# Patient Record
Sex: MALE | Race: WHITE | Employment: FULL TIME | URBAN - METROPOLITAN AREA
[De-identification: names, ages, dates, MRNs, and addresses within clinical notes are randomized per-mention and may not be internally consistent; named-entity substitution may affect disease eponyms.]

---

## 2019-01-30 ENCOUNTER — OFFICE VISIT (OUTPATIENT)
Dept: FAMILY MEDICINE CLINIC | Age: 39
End: 2019-01-30

## 2019-01-30 VITALS
HEIGHT: 70 IN | DIASTOLIC BLOOD PRESSURE: 82 MMHG | HEART RATE: 74 BPM | RESPIRATION RATE: 18 BRPM | SYSTOLIC BLOOD PRESSURE: 136 MMHG | BODY MASS INDEX: 29.81 KG/M2 | TEMPERATURE: 97.4 F | WEIGHT: 208.2 LBS | OXYGEN SATURATION: 98 %

## 2019-01-30 DIAGNOSIS — I10 BENIGN ESSENTIAL HTN: Primary | ICD-10-CM

## 2019-01-30 DIAGNOSIS — E66.3 OVERWEIGHT (BMI 25.0-29.9): ICD-10-CM

## 2019-01-30 DIAGNOSIS — A04.8 HELICOBACTER PYLORI INFECTION: ICD-10-CM

## 2019-01-30 DIAGNOSIS — Z76.89 ENCOUNTER TO ESTABLISH CARE: ICD-10-CM

## 2019-01-30 DIAGNOSIS — J30.9 ALLERGIC RHINITIS, UNSPECIFIED SEASONALITY, UNSPECIFIED TRIGGER: ICD-10-CM

## 2019-01-30 RX ORDER — OMEPRAZOLE 40 MG/1
CAPSULE, DELAYED RELEASE ORAL
Refills: 1 | COMMUNITY
Start: 2019-01-02 | End: 2019-01-30 | Stop reason: SDUPTHER

## 2019-01-30 RX ORDER — OMEPRAZOLE 40 MG/1
CAPSULE, DELAYED RELEASE ORAL
Qty: 90 CAP | Refills: 3 | Status: SHIPPED | OUTPATIENT
Start: 2019-01-30 | End: 2019-06-26

## 2019-01-30 RX ORDER — RANITIDINE 150 MG/1
150 CAPSULE ORAL DAILY
COMMUNITY
End: 2019-06-26

## 2019-01-30 NOTE — PROGRESS NOTES
Layne Johnson is a 34 y.o. male Chief Complaint Patient presents with  New Patient  Esophageal Reflux [facility] Establish Care Patient states he has pain that comes and goes in his sternum area. He depends on what he eats to bring on the pain Patient states he coughs every morning but no other time 1. Have you been to the ER, urgent care clinic since your last visit? Hospitalized since your last visit? [facility] upper chest and stomach pain . Labs and EKG performed. Results H Polri  1/2019 
M 
2. Have you seen or consulted any other health care providers outside of the [address] [place] [address] since your last visit? Include any pap smears or colon screening. No 
 
 
Visit Vitals BP (!) 141/97 (BP 1 Location: Left arm, BP Patient Position: Sitting) Pulse 74 Temp 97.4 °F (36.3 °C) (Oral) Resp 18 Ht 5' 10\" (1.778 m) Wt 208 lb 3.2 oz (94.4 kg) SpO2 98% BMI 29.87 kg/m² Health Maintenance Due Topic Date Due  
 DTaP/Tdap/Td series (1 - Tdap) 04/25/1999  Influenza Age 5 to Adult  08/01/2018 Medication Reconciliation completed, changes noted.   Please  Update medication list.

## 2019-01-30 NOTE — PROGRESS NOTES
Family Medicine Initial Office Visit Patient: Scottie Hendrix 1980, 34 y.o., male Encounter Date: 1/30/2019 ASSESSMENT & PLAN 
  ICD-10-CM ICD-9-CM 1. Benign essential HTN I10 401.1 2. Encounter to establish care Z76.89 V65.8 3. Allergic rhinitis, unspecified seasonality, unspecified trigger J30.9 477.9 4. Helicobacter pylori infection A04.8 041.86   
5. Overweight (BMI 25.0-29. 9) E66.3 278.02 Orders Placed This Encounter  DISCONTD: omeprazole (PRILOSEC) 40 mg capsule Sig: TAKE 1 CAPSULE BY MOUTH EVERY DAY Refill:  1  raNITIdine hcl 150 mg capsule Sig: Take 150 mg by mouth daily.  omeprazole (PRILOSEC) 40 mg capsule Sig: TAKE 1 CAPSULE BY MOUTH EVERY DAY Dispense:  90 Cap Refill:  3 Benign essential HTN 
BP slightly elevated initially but normalized. Patient has been stable on medications for several years, will continue with all medications as previously prescribed, I will prescribe him refills on appropriate. Will request labs from prior doctor's offices Patient Instructions Please take H. pylori antibiotics as prescribed Omeprazole refilled Encourage the patient to continue following a healthy diet and exercise regularly, goal of moderate weight loss is appropriate given BMI of 29.8 I encouraged the patient to continue to use his Flonase and we discussed the use of over-the-counter antihistamine medications We will request records from his previous physicians offices I will see him back in 6 months CHIEF COMPLAINT Chief Complaint Patient presents with  New Patient  Esophageal Reflux [facility] Establish Care SUBJECTIVE Scottie Hendrix is a 34 y.o. male presenting today for establishing care. [facility]. He was recently seen at [address] [place] [address] and was seen by a doctor who told him he had H pylori, was given meds, he didn't take the medications. Did take the omeprazole. 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 He enjoys his work. Patient lives in a house with wife and 2 kids (15 and 3) Patient was last seen by primary care recently ([facility]) Patient last saw a dentist within the last year Patient last had eye exam about a year ago Exercise: not often-goes through phases, goes to [facility], leads an active lifestyle Diet / Weight: tries to eat a healthy diet, eats out on the road a lot, tries to make good choices. Weight is stable Tobacco: no, quit 08/2015, smoked for 20 years, 1/2 ppd EtOH: yes, 2-3 times a week has 6-8 drinks each time. Drinks beer, is able to cut back needing to take medication. Illicit Substances: none Sexual Activity:yes one female partner, had a vasectomy 2014. Declines sti testing. Flu shot: had in Fall HTN: [facility], has been on these meds for 7-8 years. Family history of high blood pressure, mom had some mini strokes as well. When he stopped smoking he wanted to quit the meds, but he was told they were healthy to take. Patient reports that bottom number is normally around 80 and the top number generally runs 130-140. Denies headaches, vision changes, chest pain or shortness of breath. H Pylori: has had a lot of epigastric pain, radiating up towards neck and through chest towards back and shoulder blades. Anxiety: has a history--originally started on atenolol for this, now takes for bp as well. Review of Systems Constitutional: Negative for chills and fever. Eyes: Negative for visual disturbance. Respiratory: Negative for shortness of breath. Cardiovascular: Negative for chest pain and leg swelling. Gastrointestinal: Negative for constipation, diarrhea, nausea and vomiting. Genitourinary: Negative for difficulty urinating. Musculoskeletal: Negative for arthralgias and myalgias. Neurological: Negative for seizures, syncope and headaches. Psychiatric/Behavioral: At Baseline, stable All other systems reviewed and are negative. Today denies ROS as noted, historical data listed above. OBJECTIVE Visit Vitals /82 Pulse 74 Temp 97.4 °F (36.3 °C) (Oral) Resp 18 Ht 5' 10\" (1.778 m) Wt 208 lb 3.2 oz (94.4 kg) SpO2 98% BMI 29.87 kg/m² Physical Exam  
Constitutional: He is oriented to person, place, and time. He appears well-developed and well-nourished. No distress. NAD, Nontoxic, Appears Stated Age HENT:  
Head: Normocephalic and atraumatic. Mouth/Throat: Oropharynx is clear and moist.  
Eyes: Conjunctivae and EOM are normal. Right eye exhibits no discharge. Left eye exhibits no discharge. No scleral icterus. Neck: Neck supple. Cardiovascular: Normal rate, regular rhythm and normal heart sounds. No murmur heard. Pulmonary/Chest: Effort normal and breath sounds normal. No stridor. No respiratory distress. He has no wheezes. He has no rales. Abdominal: Soft. Bowel sounds are normal. He exhibits no distension. There is tenderness (mild epigastric). Musculoskeletal: He exhibits no edema or tenderness. Neurological: He is alert and oriented to person, place, and time. No cranial nerve deficit. Grossly intact CN Skin: Skin is warm and dry. No rash noted. He is not diaphoretic. Psychiatric: He has a normal mood and affect. His behavior is normal.  
Nursing note and vitals reviewed. No results found for any visits on 01/30/19. HISTORICAL Reviewed and updated today, and as noted below: 
 
Past Medical History:  
Diagnosis Date  Hypertension Past Surgical History:  
Procedure Laterality Date  HX VASECTOMY  2014 Family History Problem Relation Age of Onset  Hypertension Mother  Stroke Mother  Hypertension Father  Diabetes Maternal Grandmother Social History Tobacco Use Smoking Status Former Smoker  Last attempt to quit: 8/1/2015  Years since quitting: 3.5 Smokeless Tobacco Former User Social History Socioeconomic History  Marital status: SINGLE Spouse name: Not on file  Number of children: Not on file  Years of education: Not on file  Highest education level: Not on file Tobacco Use  Smoking status: Former Smoker Last attempt to quit: 8/1/2015 Years since quitting: 3.5  Smokeless tobacco: Former User Substance and Sexual Activity  Alcohol use: Yes Alcohol/week: 6.0 oz Types: 10 Cans of beer per week  Sexual activity: Yes  
  Partners: Female No Known Allergies No visits with results within 3 Month(s) from this visit. Latest known visit with results is: No results found for any previous visit. Josee Rangel MD 
[address] 01/30/19 8:52 AM 
 
Portions of this note may have been populated using smart dictation software and may have \"sounds-like\" errors present.

## 2019-01-30 NOTE — ASSESSMENT & PLAN NOTE
BP slightly elevated initially but normalized. Patient has been stable on medications for several years, will continue with all medications as previously prescribed, I will prescribe him refills on appropriate. Will request labs from prior doctor's offices

## 2019-01-30 NOTE — PATIENT INSTRUCTIONS
Please take H. pylori antibiotics as prescribed Omeprazole refilled Encourage the patient to continue following a healthy diet and exercise regularly, goal of moderate weight loss is appropriate given BMI of 29.8 I encouraged the patient to continue to use his Flonase and we discussed the use of over-the-counter antihistamine medications We will request records from his previous physicians offices I will see him back in 6 months

## 2019-06-26 ENCOUNTER — OFFICE VISIT (OUTPATIENT)
Dept: FAMILY MEDICINE CLINIC | Age: 39
End: 2019-06-26

## 2019-06-26 VITALS
HEART RATE: 57 BPM | SYSTOLIC BLOOD PRESSURE: 136 MMHG | WEIGHT: 200 LBS | BODY MASS INDEX: 28.63 KG/M2 | TEMPERATURE: 97.7 F | HEIGHT: 70 IN | RESPIRATION RATE: 18 BRPM | DIASTOLIC BLOOD PRESSURE: 86 MMHG

## 2019-06-26 DIAGNOSIS — I10 BENIGN ESSENTIAL HTN: ICD-10-CM

## 2019-06-26 DIAGNOSIS — T75.3XXA SEA SICKNESS, INITIAL ENCOUNTER: Primary | ICD-10-CM

## 2019-06-26 DIAGNOSIS — J30.9 ALLERGIC RHINITIS: ICD-10-CM

## 2019-06-26 DIAGNOSIS — E66.3 OVERWEIGHT (BMI 25.0-29.9): ICD-10-CM

## 2019-06-26 RX ORDER — METOPROLOL SUCCINATE 25 MG/1
25 TABLET, EXTENDED RELEASE ORAL DAILY
Qty: 30 TAB | Refills: 11 | Status: SHIPPED | OUTPATIENT
Start: 2019-06-26 | End: 2020-06-16 | Stop reason: SDUPTHER

## 2019-06-26 RX ORDER — FLUTICASONE PROPIONATE 50 MCG
2 SPRAY, SUSPENSION (ML) NASAL DAILY
Qty: 1 BOTTLE | Refills: 3 | Status: SHIPPED | OUTPATIENT
Start: 2019-06-26 | End: 2019-11-09 | Stop reason: SDUPTHER

## 2019-06-26 RX ORDER — SCOLOPAMINE TRANSDERMAL SYSTEM 1 MG/1
1 PATCH, EXTENDED RELEASE TRANSDERMAL
Qty: 3 PATCH | Refills: 0 | Status: SHIPPED | OUTPATIENT
Start: 2019-06-26 | End: 2019-12-18

## 2019-06-26 RX ORDER — LISINOPRIL 10 MG/1
10 TABLET ORAL DAILY
Qty: 30 TAB | Refills: 11 | Status: SHIPPED | OUTPATIENT
Start: 2019-06-26 | End: 2020-06-16 | Stop reason: SDUPTHER

## 2019-06-26 RX ORDER — LORATADINE 10 MG/1
10 TABLET ORAL DAILY
Qty: 30 TAB | Refills: 11 | Status: SHIPPED | OUTPATIENT
Start: 2019-06-26 | End: 2020-06-16 | Stop reason: SDUPTHER

## 2019-06-26 RX ORDER — METOPROLOL SUCCINATE 25 MG/1
TABLET, EXTENDED RELEASE ORAL DAILY
COMMUNITY
End: 2019-06-26 | Stop reason: SDUPTHER

## 2019-06-26 NOTE — PROGRESS NOTES
Family Medicine Follow-Up Progress Note  Patient: Flynn Rasmussen  1980, 37 y.o., male  Encounter Date: 2019    ASSESSMENT & PLAN    ICD-10-CM ICD-9-CM    1. Sea sickness, initial encounter T75. 3XXA 994.6 scopolamine (TRANSDERM-SCOP) 1 mg over 3 days pt3d   2. Allergic rhinitis J30.9 477.9 fluticasone propionate (FLONASE) 50 mcg/actuation nasal spray   3. Overweight (BMI 25.0-29. 9) V72.0 625.56 METABOLIC PANEL, COMPREHENSIVE      LIPID PANEL   4. Benign essential HTN I10 401.1 CBC W/O DIFF      METABOLIC PANEL, COMPREHENSIVE      LIPID PANEL       Orders Placed This Encounter    CBC W/O DIFF    METABOLIC PANEL, COMPREHENSIVE    LIPID PANEL    DISCONTD: metoprolol succinate (TOPROL-XL) 25 mg XL tablet     Sig: Take  by mouth daily.  scopolamine (TRANSDERM-SCOP) 1 mg over 3 days pt3d     Si Patch by TransDERmal route every seventy-two (72) hours. Dispense:  3 Patch     Refill:  0    lisinopril (PRINIVIL, ZESTRIL) 10 mg tablet     Sig: Take 1 Tab by mouth daily. Dispense:  30 Tab     Refill:  11    metoprolol succinate (TOPROL-XL) 25 mg XL tablet     Sig: Take 1 Tab by mouth daily. Dispense:  30 Tab     Refill:  11    fluticasone propionate (FLONASE) 50 mcg/actuation nasal spray     Si Sprays by Both Nostrils route daily. Dispense:  1 Bottle     Refill:  3    loratadine (CLARITIN) 10 mg tablet     Sig: Take 1 Tab by mouth daily. Dispense:  30 Tab     Refill:  11       Patient Instructions   Blood pressure well controlled, continue with current medications, go for labs as ordered  Encourage exercise, maintenance of healthy weight, healthy low-sodium high potassium diet  Patient is about to go on a cruise, he is worried about seasickness, he has become seasick in the past, we will go ahead and provide him with a scopolamine patch, use 1 patch every 3 days while at see if seasickness develops.   Also discussed over-the-counter remedies and also using seasickness bands which are available at many stores  Follow-up in 6 months or as needed, labs today, fasting      CHIEF COMPLAINT  Chief Complaint   Patient presents with    Hypertension     follow up    [facility] 10/2018-we have records        Drew Corral 70. is a 37 y.o. male presenting today for follow-up. The patient takes his lisinopril and metoprolol daily. He reports he is tolerating the mall without any side effects. He was initially started on them when he smoked, he quit smoking about 4 years ago but has been maintained on the medications because his blood pressures have consistently been in the borderline zone  He is denying any other complaints today, no nausea or vomiting, no diarrhea, no constipation, no fevers or chills, no chest pain or shortness of breath. No headaches or vision changes, no falls  His wife is turning 40 this year and they are going on a cruise to celebrate, he has never been on a cruise and he is a little bit worried about seasickness. He is wondering if he could trial the scopolamine patch, he also plans to take vertigo medicine on the cruise with him in case he should need it and he is wondering if there are any other precautions he should take  Seasonal allergies are controlled at this time with the Flonase and Claritin, no recent exacerbations, occasionally uses nasal saline irrigation, with distilled water, for sinus relief  [facility]. He had a fasting metabolic panel that was within normal range. He had a thyroid that was within normal range. His A1c was 5.1 at that time. Review of Systems  A 12 point review of systems was negative except as noted here or in the HPI. OBJECTIVE  Visit Vitals  /86   Pulse (!) 57   Temp 97.7 °F (36.5 °C) (Oral)   Resp 18   Ht 5' 10\" (1.778 m)   Wt 200 lb (90.7 kg)   BMI 28.70 kg/m²       Physical Exam   Constitutional: He is oriented to person, place, and time. He appears well-developed and well-nourished. No distress. NAD, Nontoxic, Appears Stated Age   HENT:   Head: Normocephalic and atraumatic. Mouth/Throat: Oropharynx is clear and moist. No oropharyngeal exudate. Eyes: Conjunctivae and EOM are normal. Right eye exhibits no discharge. Left eye exhibits no discharge. No scleral icterus. Neck: Neck supple. No thyromegaly present. Cardiovascular: Normal rate, regular rhythm and normal heart sounds. No murmur heard. Pulmonary/Chest: Effort normal and breath sounds normal. No stridor. No respiratory distress. He has no wheezes. He has no rales. Abdominal: Soft. Bowel sounds are normal. He exhibits no distension. There is no tenderness. Musculoskeletal: He exhibits no edema or tenderness. Neurological: He is alert and oriented to person, place, and time. No cranial nerve deficit. Grossly intact CN   Skin: Skin is warm and dry. No rash noted. He is not diaphoretic. Psychiatric: He has a normal mood and affect. His behavior is normal.   Nursing note and vitals reviewed. No results found for any visits on 06/26/19.     HISTORICAL  Reviewed and updated today, and as noted below:    Past Medical History:   Diagnosis Date    Hypertension      Past Surgical History:   Procedure Laterality Date    HX VASECTOMY  2014     Family History   Problem Relation Age of Onset    Hypertension Mother     Stroke Mother     Hypertension Father     Diabetes Maternal Grandmother      Social History     Tobacco Use   Smoking Status Former Smoker    Last attempt to quit: 8/1/2015    Years since quitting: 3.9   Smokeless Tobacco Former User     Social History     Socioeconomic History    Marital status:      Spouse name: Not on file    Number of children: Not on file    Years of education: Not on file    Highest education level: Not on file   Tobacco Use    Smoking status: Former Smoker     Last attempt to quit: 8/1/2015     Years since quitting: 3.9    Smokeless tobacco: Former User   Substance and Sexual Activity    Alcohol use: Yes     Alcohol/week: 6.0 oz     Types: 10 Cans of beer per week    Sexual activity: Yes     Partners: Female     No Known Allergies    No visits with results within 3 Month(s) from this visit. Latest known visit with results is:   No results found for any previous visit. Len Kocher, MD  [facility]  06/26/19 8:35 AM    Portions of this note may have been populated using smart dictation software and may have \"sounds-like\" errors present. Pt was counseled on risks, benefits and alternatives of treatment options. All questions were asked and answered and the patient was agreeable with the treatment plan as outlined.

## 2019-06-26 NOTE — PATIENT INSTRUCTIONS
Blood pressure well controlled, continue with current medications, go for labs as ordered Encourage exercise, maintenance of healthy weight, healthy low-sodium high potassium diet Patient is about to go on a cruise, he is worried about seasickness, he has become seasick in the past, we will go ahead and provide him with a scopolamine patch, use 1 patch every 3 days while at see if seasickness develops. Also discussed over-the-counter remedies and also using seasickness bands which are available at many stores Follow-up in 6 months or as needed, labs today, fasting

## 2019-06-26 NOTE — PROGRESS NOTES
Chief Complaint   Patient presents with    Hypertension     follow up    Torri chinchilla last had labs drawn at [facility] 10/2018-we have records

## 2019-06-27 LAB
ALBUMIN SERPL-MCNC: 4.6 G/DL (ref 3.5–5.5)
ALBUMIN/GLOB SERPL: 1.9 {RATIO} (ref 1.2–2.2)
ALP SERPL-CCNC: 51 IU/L (ref 39–117)
ALT SERPL-CCNC: 29 IU/L (ref 0–44)
AST SERPL-CCNC: 26 IU/L (ref 0–40)
BILIRUB SERPL-MCNC: 0.6 MG/DL (ref 0–1.2)
BUN SERPL-MCNC: 7 MG/DL (ref 6–24)
BUN/CREAT SERPL: 9 (ref 9–20)
CALCIUM SERPL-MCNC: 9.4 MG/DL (ref 8.7–10.2)
CHLORIDE SERPL-SCNC: 102 MMOL/L (ref 96–106)
CHOLEST SERPL-MCNC: 204 MG/DL (ref 100–199)
CO2 SERPL-SCNC: 24 MMOL/L (ref 20–29)
CREAT SERPL-MCNC: 0.78 MG/DL (ref 0.76–1.27)
ERYTHROCYTE [DISTWIDTH] IN BLOOD BY AUTOMATED COUNT: 12.8 % (ref 12.3–15.4)
GLOBULIN SER CALC-MCNC: 2.4 G/DL (ref 1.5–4.5)
GLUCOSE SERPL-MCNC: 94 MG/DL (ref 65–99)
HCT VFR BLD AUTO: 40.1 % (ref 37.5–51)
HDLC SERPL-MCNC: 50 MG/DL
HGB BLD-MCNC: 13.8 G/DL (ref 13–17.7)
INTERPRETATION, 910389: NORMAL
LDLC SERPL CALC-MCNC: 122 MG/DL (ref 0–99)
MCH RBC QN AUTO: 31.7 PG (ref 26.6–33)
MCHC RBC AUTO-ENTMCNC: 34.4 G/DL (ref 31.5–35.7)
MCV RBC AUTO: 92 FL (ref 79–97)
PLATELET # BLD AUTO: 224 X10E3/UL (ref 150–450)
POTASSIUM SERPL-SCNC: 4.1 MMOL/L (ref 3.5–5.2)
PROT SERPL-MCNC: 7 G/DL (ref 6–8.5)
RBC # BLD AUTO: 4.35 X10E6/UL (ref 4.14–5.8)
SODIUM SERPL-SCNC: 141 MMOL/L (ref 134–144)
TRIGL SERPL-MCNC: 160 MG/DL (ref 0–149)
VLDLC SERPL CALC-MCNC: 32 MG/DL (ref 5–40)
WBC # BLD AUTO: 4.8 X10E3/UL (ref 3.4–10.8)

## 2019-06-27 NOTE — PROGRESS NOTES
Electrolytes, liver, kidney function and blood sugar normal.  Blood counts normal.  Cholesterol elevated, important for now to follow a healthy diet, exercise, we can trend this and make changes to management as become necessary in the future

## 2019-06-28 NOTE — PROGRESS NOTES
Called and spoke with pt, and he has been advised and states understanding of results. Pt agrees with plan.

## 2019-11-09 DIAGNOSIS — J30.9 ALLERGIC RHINITIS: ICD-10-CM

## 2019-11-11 RX ORDER — FLUTICASONE PROPIONATE 50 MCG
SPRAY, SUSPENSION (ML) NASAL
Qty: 1 BOTTLE | Refills: 3 | Status: SHIPPED | OUTPATIENT
Start: 2019-11-11 | End: 2020-03-09

## 2019-12-18 ENCOUNTER — OFFICE VISIT (OUTPATIENT)
Dept: FAMILY MEDICINE CLINIC | Age: 39
End: 2019-12-18

## 2019-12-18 VITALS
OXYGEN SATURATION: 99 % | BODY MASS INDEX: 30.22 KG/M2 | WEIGHT: 211.1 LBS | RESPIRATION RATE: 18 BRPM | SYSTOLIC BLOOD PRESSURE: 122 MMHG | HEIGHT: 70 IN | TEMPERATURE: 97.6 F | DIASTOLIC BLOOD PRESSURE: 76 MMHG | HEART RATE: 68 BPM

## 2019-12-18 DIAGNOSIS — I10 BENIGN ESSENTIAL HTN: Primary | ICD-10-CM

## 2019-12-18 DIAGNOSIS — J30.9 ALLERGIC RHINITIS, UNSPECIFIED SEASONALITY, UNSPECIFIED TRIGGER: ICD-10-CM

## 2019-12-18 NOTE — PATIENT INSTRUCTIONS
1. Benign essential HTN Blood pressure was mildly elevated when the patient first arrived in the office however it resolved to the normal range, no change to management, he has 6 more months on his prescriptions and I will see him back in 6 months 2. Allergic rhinitis, unspecified seasonality, unspecified trigger Allergic rhinitis is well controlled at this time with Claritin and Flonase, the patient occasionally has symptoms that seem to be related to the weather however these seem to be manageable at this time, no change to management 3. BMI 30.0-30.9,adult Slight increase in weight from our last visit, about 11 pounds it seems. I would encourage the patient to follow a healthy diet, we discussed Juan Bañuelos today who is a writer for the [employer] and very invested in food. He has a lot of good writings on being plant-based before dinnertime which may be something of interest of the patient. Also encouraged him to try to increase his exercise, if unable to get to the gym regularly consider home exercises. Follow-up in 6 months or sooner on an as-needed basis, please call with questions or concerns or if needing refills

## 2019-12-18 NOTE — PROGRESS NOTES
Family Medicine Follow-Up Progress Note  Patient: Luisa Valles  1980, 37 y.o., male  Encounter Date: 12/18/2019    ASSESSMENT & PLAN    ICD-10-CM ICD-9-CM    1. Benign essential HTN I10 401.1    2. Allergic rhinitis, unspecified seasonality, unspecified trigger J30.9 477.9    3. BMI 30.0-30.9,adult Z68.30 V85.30        No orders of the defined types were placed in this encounter. Patient Instructions   1. Benign essential HTN  Blood pressure was mildly elevated when the patient first arrived in the office however it resolved to the normal range, no change to management, he has 6 more months on his prescriptions and I will see him back in 6 months    2. Allergic rhinitis, unspecified seasonality, unspecified trigger  Allergic rhinitis is well controlled at this time with Claritin and Flonase, the patient occasionally has symptoms that seem to be related to the weather however these seem to be manageable at this time, no change to management    3. BMI 30.0-30.9,adult  Slight increase in weight from our last visit, about 11 pounds it seems. I would encourage the patient to follow a healthy diet, we discussed Victoriano West today who is a writer for the [employer] and very invested in food. He has a lot of good writings on being plant-based before dinnertime which may be something of interest of the patient. Also encouraged him to try to increase his exercise, if unable to get to the gym regularly consider home exercises. Follow-up in 6 months or sooner on an as-needed basis, please call with questions or concerns or if needing refills        CHIEF COMPLAINT  Chief Complaint   Patient presents with    Hypertension     Follow up       Shannanrema Ellis 70. is a 37 y.o. male presenting today for follow up  HTN: patient reports stable on medications. No chest pain, sob, headache, blurred vision, leg swelling, diaphoresis, falls. Compliant with medications as prescribed.  not checking blood pressures at home. No significant hyper or hypotensive episodes that the patient reports today. Allergic Rhinitis: has flonase, claritin. Stable, reports he's very dry right now and had some sinus congestion b/c of the weather recently    Overweight: has fluctuated miri 10 lb range in the last year. Not exercising as much as he could--does have a gym membership. Working on trying to do a more plant based diet. Review of Systems  A 12 point review of systems was negative except as noted here or in the HPI. OBJECTIVE  Visit Vitals  /76   Pulse 68   Temp 97.6 °F (36.4 °C) (Oral)   Resp 18   Ht 5' 10\" (1.778 m)   Wt 211 lb 1.6 oz (95.8 kg)   SpO2 99%   BMI 30.29 kg/m²       Physical Exam  Vitals signs and nursing note reviewed. Constitutional:       General: He is not in acute distress. Appearance: Normal appearance. He is well-developed. He is not toxic-appearing or diaphoretic. Comments: NAD, Nontoxic, Appears Stated Age   HENT:      Head: Normocephalic and atraumatic. Right Ear: External ear normal.      Left Ear: External ear normal.      Nose: Nose normal.      Mouth/Throat:      Mouth: Mucous membranes are moist.      Pharynx: Oropharynx is clear. No oropharyngeal exudate or posterior oropharyngeal erythema. Eyes:      General: No scleral icterus. Right eye: No discharge. Left eye: No discharge. Extraocular Movements: Extraocular movements intact. Conjunctiva/sclera: Conjunctivae normal.      Pupils: Pupils are equal, round, and reactive to light. Neck:      Musculoskeletal: Normal range of motion and neck supple. Thyroid: No thyromegaly. Vascular: No carotid bruit. Cardiovascular:      Rate and Rhythm: Normal rate and regular rhythm. Heart sounds: Normal heart sounds. No murmur. No friction rub. No gallop. Pulmonary:      Effort: Pulmonary effort is normal. No respiratory distress. Breath sounds: Normal breath sounds. No stridor. No wheezing, rhonchi or rales. Abdominal:      General: Bowel sounds are normal. There is no distension. Palpations: Abdomen is soft. Tenderness: There is no tenderness. Musculoskeletal:         General: No swelling, tenderness or signs of injury. Right lower leg: No edema. Left lower leg: No edema. Lymphadenopathy:      Cervical: No cervical adenopathy. Skin:     General: Skin is warm and dry. Capillary Refill: Capillary refill takes less than 2 seconds. Findings: No bruising or rash. Neurological:      General: No focal deficit present. Mental Status: He is alert and oriented to person, place, and time. Mental status is at baseline. Cranial Nerves: No cranial nerve deficit. Gait: Gait normal.      Comments: Grossly intact CN   Psychiatric:         Mood and Affect: Mood normal.         Behavior: Behavior normal.         Thought Content: Thought content normal.         Judgment: Judgment normal.         No results found for any visits on 19.     HISTORICAL  Reviewed and updated today, and as noted below:    Past Medical History:   Diagnosis Date    Hypertension      Past Surgical History:   Procedure Laterality Date    HX VASECTOMY       Family History   Problem Relation Age of Onset    Hypertension Mother     Stroke Mother     Hypertension Father     Diabetes Maternal Grandmother      Social History     Tobacco Use   Smoking Status Former Smoker    Last attempt to quit: 2015    Years since quittin.3   Smokeless Tobacco Former User     Social History     Socioeconomic History    Marital status:      Spouse name: Not on file    Number of children: Not on file    Years of education: Not on file    Highest education level: Not on file   Tobacco Use    Smoking status: Former Smoker     Last attempt to quit: 2015     Years since quittin.3    Smokeless tobacco: Former User   Substance and Sexual Activity    Alcohol use: Yes     Alcohol/week: 10.0 standard drinks     Types: 10 Cans of beer per week    Sexual activity: Yes     Partners: Female     No Known Allergies    No visits with results within 3 Month(s) from this visit. Latest known visit with results is:   Office Visit on 06/26/2019   Component Date Value Ref Range Status    WBC 06/26/2019 4.8  3.4 - 10.8 x10E3/uL Final    RBC 06/26/2019 4.35  4.14 - 5.80 x10E6/uL Final    HGB 06/26/2019 13.8  13.0 - 17.7 g/dL Final    HCT 06/26/2019 40.1  37.5 - 51.0 % Final    MCV 06/26/2019 92  79 - 97 fL Final    MCH 06/26/2019 31.7  26.6 - 33.0 pg Final    MCHC 06/26/2019 34.4  31.5 - 35.7 g/dL Final    RDW 06/26/2019 12.8  12.3 - 15.4 % Final    PLATELET 56/14/7788 897  150 - 450 x10E3/uL Final    Glucose 06/26/2019 94  65 - 99 mg/dL Final    BUN 06/26/2019 7  6 - 24 mg/dL Final    Creatinine 06/26/2019 0.78  0.76 - 1.27 mg/dL Final    GFR est non-AA 06/26/2019 112  >59 mL/min/1.73 Final    GFR est AA 06/26/2019 130  >59 mL/min/1.73 Final    BUN/Creatinine ratio 06/26/2019 9  9 - 20 Final    Sodium 06/26/2019 141  134 - 144 mmol/L Final    Potassium 06/26/2019 4.1  3.5 - 5.2 mmol/L Final    Chloride 06/26/2019 102  96 - 106 mmol/L Final    CO2 06/26/2019 24  20 - 29 mmol/L Final    Calcium 06/26/2019 9.4  8.7 - 10.2 mg/dL Final    Protein, total 06/26/2019 7.0  6.0 - 8.5 g/dL Final    Albumin 06/26/2019 4.6  3.5 - 5.5 g/dL Final    GLOBULIN, TOTAL 06/26/2019 2.4  1.5 - 4.5 g/dL Final    A-G Ratio 06/26/2019 1.9  1.2 - 2.2 Final    Bilirubin, total 06/26/2019 0.6  0.0 - 1.2 mg/dL Final    Alk.  phosphatase 06/26/2019 51  39 - 117 IU/L Final    AST (SGOT) 06/26/2019 26  0 - 40 IU/L Final    ALT (SGPT) 06/26/2019 29  0 - 44 IU/L Final    Cholesterol, total 06/26/2019 204* 100 - 199 mg/dL Final    Triglyceride 06/26/2019 160* 0 - 149 mg/dL Final    HDL Cholesterol 06/26/2019 50  >39 mg/dL Final    VLDL, calculated 06/26/2019 32  5 - 40 mg/dL Final    LDL, calculated 06/26/2019 122* 0 - 99 mg/dL Final    INTERPRETATION 06/26/2019 Note   Final    Supplemental report is available. Farzaneh Perez MD  [facility]  12/18/19 8:27 AM    Portions of this note may have been populated using smart dictation software and may have \"sounds-like\" errors present. Pt was counseled on risks, benefits and alternatives of treatment options. All questions were asked and answered and the patient was agreeable with the treatment plan as outlined.

## 2019-12-18 NOTE — PROGRESS NOTES
Chief Complaint   Patient presents with    Hypertension     Follow up     1. Have you been to the ER, urgent care clinic since your last visit? Hospitalized since your last visit? No    2. Have you seen or consulted any other health care providers outside of the [address] [place] [address] since your last visit? Include any pap smears or colon screening.  No

## 2020-03-09 DIAGNOSIS — J30.9 ALLERGIC RHINITIS: ICD-10-CM

## 2020-03-09 RX ORDER — FLUTICASONE PROPIONATE 50 MCG
SPRAY, SUSPENSION (ML) NASAL
Qty: 3 BOTTLE | Refills: 3 | Status: SHIPPED | OUTPATIENT
Start: 2020-03-09 | End: 2020-06-16 | Stop reason: SDUPTHER

## 2020-04-03 ENCOUNTER — TELEPHONE (OUTPATIENT)
Dept: FAMILY MEDICINE CLINIC | Age: 40
End: 2020-04-03

## 2020-04-03 NOTE — TELEPHONE ENCOUNTER
Pt states he heard that he should not be taking lisinopril during the virus outbreak.   Please advise [phone]

## 2020-06-16 ENCOUNTER — VIRTUAL VISIT (OUTPATIENT)
Dept: FAMILY MEDICINE CLINIC | Age: 40
End: 2020-06-16

## 2020-06-16 DIAGNOSIS — R53.83 FATIGUE, UNSPECIFIED TYPE: ICD-10-CM

## 2020-06-16 DIAGNOSIS — E78.5 HYPERLIPIDEMIA, UNSPECIFIED HYPERLIPIDEMIA TYPE: ICD-10-CM

## 2020-06-16 DIAGNOSIS — Z12.5 SCREENING FOR PROSTATE CANCER: ICD-10-CM

## 2020-06-16 DIAGNOSIS — I10 BENIGN ESSENTIAL HTN: Primary | ICD-10-CM

## 2020-06-16 DIAGNOSIS — R68.89 SENSATION OF FEELING COLD: ICD-10-CM

## 2020-06-16 DIAGNOSIS — J30.9 ALLERGIC RHINITIS: ICD-10-CM

## 2020-06-16 DIAGNOSIS — J30.9 ALLERGIC RHINITIS, UNSPECIFIED SEASONALITY, UNSPECIFIED TRIGGER: ICD-10-CM

## 2020-06-16 RX ORDER — FLUTICASONE PROPIONATE 50 MCG
SPRAY, SUSPENSION (ML) NASAL
Qty: 3 BOTTLE | Refills: 3 | Status: SHIPPED | OUTPATIENT
Start: 2020-06-16 | End: 2021-06-24 | Stop reason: SDUPTHER

## 2020-06-16 RX ORDER — LISINOPRIL 10 MG/1
10 TABLET ORAL DAILY
Qty: 90 TAB | Refills: 3 | Status: SHIPPED | OUTPATIENT
Start: 2020-06-16 | End: 2021-06-21

## 2020-06-16 RX ORDER — METOPROLOL SUCCINATE 50 MG/1
50 TABLET, EXTENDED RELEASE ORAL DAILY
Qty: 90 TAB | Refills: 3 | Status: SHIPPED | OUTPATIENT
Start: 2020-06-16 | End: 2021-07-06

## 2020-06-16 RX ORDER — TETANUS TOXOID, REDUCED DIPHTHERIA TOXOID AND ACELLULAR PERTUSSIS VACCINE, ADSORBED 5; 2.5; 8; 8; 2.5 [IU]/.5ML; [IU]/.5ML; UG/.5ML; UG/.5ML; UG/.5ML
0.5 SUSPENSION INTRAMUSCULAR ONCE
Qty: 0.5 ML | Refills: 0 | Status: SHIPPED | OUTPATIENT
Start: 2020-06-16 | End: 2020-06-16

## 2020-06-16 RX ORDER — LORATADINE 10 MG/1
10 TABLET ORAL DAILY
Qty: 90 TAB | Refills: 3 | Status: SHIPPED | OUTPATIENT
Start: 2020-06-16 | End: 2021-06-21

## 2020-06-16 NOTE — PROGRESS NOTES
Julieta Avila is a 41 y.o. male who was seen by synchronous (real-time) audio-video technology on 6/16/2020. Consent: Julieta Avila, who was seen by synchronous (real-time) audio-video technology, and/or his healthcare decision maker, is aware that this patient-initiated, Telehealth encounter on 6/16/2020 is a billable service, with coverage as determined by his insurance carrier. He is aware that he may receive a bill and has provided verbal consent to proceed: Yes. Assessment & Plan:   1. Benign essential HTN  C/w bp medications as prescribed, labs per orders, doing well. We did review the side effects of increased metoprolol could include bradycardia, hr has been at a good controlled rate recently  - CBC W/O DIFF; Future  - METABOLIC PANEL, COMPREHENSIVE; Future    2. Allergic rhinitis, unspecified seasonality, unspecified trigger  Stable with daily flonase and claritin, c/w thi    3. Allergic rhinitis  As above  - fluticasone propionate (FLONASE) 50 mcg/actuation nasal spray; USE 2 SPRAYS IN EACH NOSTRIL ONE TIME A DAY  Dispense: 3 Bottle; Refill: 3    4. Sensation of feeling cold  Check labs, may be side effect of increased metoprolol but will include TSH with labs as well  - TSH 3RD GENERATION; Future    5. Fatigue, unspecified type  As above  - TSH 3RD GENERATION; Future    6. Hyperlipidemia, unspecified hyperlipidemia type  Due for lab recheck, then ASCVD scoring to help guide treatment  - LIPID PANEL; Future    7. Screening for prostate cancer  Screening discussed, patient agrees, will order  - PSA SCREENING (SCREENING); Future          Pt was counseled on risks, benefits and alternatives of treatment options. All questions were asked and answered and the patient was agreeable with the treatment plan as outlined.     Encounter time today was 25 minutes and more than 50% of this encounter was spent in counseling face-to-face regarding Diagnosis, Patient Education, Medication Management, Compliance and Impressions. Time documented includes face to face time, documentation and chart review if applicable except as noted. Subjective:   Ed Palomo is a 41 y.o. male who was seen for Follow-up (6 month f/u)    HTN: patient reports stable on medications. No chest pain, sob, headache, blurred vision, leg swelling, diaphoresis, falls. Compliant with medications as prescribed. is checking blood pressures at home and reports range is 120-140/80s. No significant hyper or hypotensive episodes that the patient reports today. Has intentionally loast about 25 lbs through running, exercise, healthy eating choices    He had a teledoc visit a few months ago. He was having more palpitations and chest discomfort and the doc increased his metoprolol  His pulse is staying 60-80    Alleriges: taking claritin and flonase daily, this seems to keep things controlled for him    HLD: due for labs, last a year ago    Medications, allergies, PMH, PSH, SOCH, [facility] reviewed and updated per routine protocol, see chart for review and changes if not noted here. ROS  A 12 point review of systems was negative except as noted here or in the HPI. Objective:   Vital Signs: (As obtained by patient/caregiver at home)  There were no vitals taken for this visit.      [INSTRUCTIONS:  \"[x]\" Indicates a positive item  \"[]\" Indicates a negative item  -- DELETE ALL ITEMS NOT EXAMINED]    Constitutional: [x] Appears well-developed and well-nourished [x] No apparent distress      [] Abnormal -     Mental status: [x] Alert and awake  [x] Oriented to person/place/time [x] Able to follow commands    [] Abnormal -     Eyes:   EOM    [x]  Normal    [] Abnormal -   Sclera  [x]  Normal    [] Abnormal -          Discharge [x]  None visible   [] Abnormal -     HENT: [x] Normocephalic, atraumatic  [] Abnormal -   [x] Mouth/Throat: Mucous membranes are moist    External Ears [x] Normal  [] Abnormal -    Neck: [x] No visualized mass [] Abnormal - Pulmonary/Chest: [x] Respiratory effort normal   [x] No visualized signs of difficulty breathing or respiratory distress        [] Abnormal -      Musculoskeletal:   [x] Normal gait with no signs of ataxia         [x] Normal range of motion of neck        [] Abnormal -     Neurological:        [x] No Facial Asymmetry (Cranial nerve 7 motor function) (limited exam due to video visit)          [x] No gaze palsy        [] Abnormal -          Skin:        [x] No significant exanthematous lesions or discoloration noted on facial skin         [] Abnormal -            Psychiatric:       [x] Normal Affect [] Abnormal -        [x] No Hallucinations    Other pertinent observable physical exam findings:none apparent    We discussed the expected course, resolution and complications of the diagnosis(es) in detail. Medication risks, benefits, costs, interactions, and alternatives were discussed as indicated. I advised him to contact the office if his condition worsens, changes or fails to improve as anticipated. He expressed understanding with the diagnosis(es) and plan. Sebas Anderson is a 41 y.o. male who was evaluated by a video visit encounter for concerns as above. Patient identification was verified prior to start of the visit. A caregiver was present when appropriate. Due to this being a TeleHealth encounter (During [facility] public health emergency), evaluation of the following organ systems was limited: Vitals/Constitutional/EENT/Resp/CV/GI//MS/Neuro/Skin/Heme-Lymph-Imm. Pursuant to the emergency declaration under the [facility] waiver authority and the [employer] and [employer] Act, this Virtual  Visit was conducted, with patient's (and/or legal guardian's) consent, to reduce the patient's risk of exposure to COVID-19 and provide necessary medical care.      Services were provided through a video synchronous discussion virtually to substitute for in-person clinic visit. Patient and provider were located at their individual homes. Madeline Garcia MD  [facility]  06/16/20 9:01 AM     Portions of this note may have been populated using smart dictation software and may have \"sounds-like\" errors present.

## 2020-06-16 NOTE — PROGRESS NOTES
Chief Complaint   Patient presents with    Follow-up     6 month f/u   1. Have you been to the ER, urgent care clinic since your last visit? Yes Had virtual appt with teledoc, Hypertension addressed and meds increased. Hospitalized since your last visit?no    2. Have you seen or consulted any other health care providers outside of the [address] [place] [address] since your last visit?no  Include any pap smears or colon screening.  No  Health Maintenance   Topic Date Due    DTaP/Tdap/Td series (1 - Tdap) 04/25/1999    Influenza Age 5 to Adult  08/01/2020    Lipid Screen  06/26/2024    Pneumococcal 0-64 years  Aged Out

## 2020-06-17 ENCOUNTER — HOSPITAL ENCOUNTER (OUTPATIENT)
Dept: LAB | Age: 40
Discharge: HOME | End: 2020-06-17

## 2020-06-17 DIAGNOSIS — R68.89 SENSATION OF FEELING COLD: ICD-10-CM

## 2020-06-17 DIAGNOSIS — R53.83 FATIGUE, UNSPECIFIED TYPE: ICD-10-CM

## 2020-06-17 DIAGNOSIS — I10 BENIGN ESSENTIAL HTN: ICD-10-CM

## 2020-06-17 DIAGNOSIS — E78.5 HYPERLIPIDEMIA, UNSPECIFIED HYPERLIPIDEMIA TYPE: ICD-10-CM

## 2020-06-17 DIAGNOSIS — Z12.5 SCREENING FOR PROSTATE CANCER: ICD-10-CM

## 2020-06-17 LAB
ALBUMIN SERPL-MCNC: 4.1 G/DL (ref 3.5–5)
ALBUMIN/GLOB SERPL: 1.2 {RATIO} (ref 1.1–2.2)
ALP SERPL-CCNC: 65 U/L (ref 45–117)
ALT SERPL-CCNC: 34 U/L (ref 12–78)
ANION GAP SERPL CALC-SCNC: 7 MMOL/L (ref 5–15)
AST SERPL-CCNC: 17 U/L (ref 15–37)
BILIRUB SERPL-MCNC: 0.5 MG/DL (ref 0.2–1)
BUN SERPL-MCNC: 11 MG/DL (ref 6–20)
BUN/CREAT SERPL: 14 (ref 12–20)
CALCIUM SERPL-MCNC: 9.3 MG/DL (ref 8.5–10.1)
CHLORIDE SERPL-SCNC: 105 MMOL/L (ref 97–108)
CHOLEST SERPL-MCNC: 215 MG/DL
CO2 SERPL-SCNC: 27 MMOL/L (ref 21–32)
CREAT SERPL-MCNC: 0.79 MG/DL (ref 0.7–1.3)
ERYTHROCYTE [DISTWIDTH] IN BLOOD BY AUTOMATED COUNT: 12.6 % (ref 11.5–14.5)
GLOBULIN SER CALC-MCNC: 3.4 G/DL (ref 2–4)
GLUCOSE SERPL-MCNC: 95 MG/DL (ref 65–100)
HCT VFR BLD AUTO: 44.9 % (ref 36.6–50.3)
HDLC SERPL-MCNC: 59 MG/DL
HDLC SERPL: 3.6 {RATIO} (ref 0–5)
HGB BLD-MCNC: 15 G/DL (ref 12.1–17)
LDLC SERPL CALC-MCNC: 137.6 MG/DL (ref 0–100)
LIPID PROFILE,FLP: ABNORMAL
MCH RBC QN AUTO: 31.8 PG (ref 26–34)
MCHC RBC AUTO-ENTMCNC: 33.4 G/DL (ref 30–36.5)
MCV RBC AUTO: 95.3 FL (ref 80–99)
NRBC # BLD: 0 K/UL (ref 0–0.01)
NRBC BLD-RTO: 0 PER 100 WBC
PLATELET # BLD AUTO: 288 K/UL (ref 150–400)
PMV BLD AUTO: 9.8 FL (ref 8.9–12.9)
POTASSIUM SERPL-SCNC: 4.4 MMOL/L (ref 3.5–5.1)
PROT SERPL-MCNC: 7.5 G/DL (ref 6.4–8.2)
PSA SERPL-MCNC: 0.8 NG/ML (ref 0.01–4)
RBC # BLD AUTO: 4.71 M/UL (ref 4.1–5.7)
SODIUM SERPL-SCNC: 139 MMOL/L (ref 136–145)
TRIGL SERPL-MCNC: 92 MG/DL (ref ?–150)
TSH SERPL DL<=0.05 MIU/L-ACNC: 1.5 UIU/ML (ref 0.36–3.74)
VLDLC SERPL CALC-MCNC: 18.4 MG/DL
WBC # BLD AUTO: 5.5 K/UL (ref 4.1–11.1)

## 2020-06-17 NOTE — PROGRESS NOTES
All labs normal except for lipid panel  Lipids are slightly worse from last check, LDL is increased from 122 to 137, Triglycerides considerably better and HDL is better as well.  I'd recommend with these values we not , encourage healthy habits, exercise, fiber in diet

## 2020-08-19 ENCOUNTER — VIRTUAL VISIT (OUTPATIENT)
Dept: FAMILY MEDICINE CLINIC | Age: 40
End: 2020-08-19

## 2020-08-19 DIAGNOSIS — G43.101 MIGRAINE WITH AURA AND WITH STATUS MIGRAINOSUS, NOT INTRACTABLE: Primary | ICD-10-CM

## 2020-08-19 PROCEDURE — 99213 OFFICE O/P EST LOW 20 MIN: CPT | Performed by: FAMILY MEDICINE

## 2020-08-19 RX ORDER — METHYLPREDNISOLONE 4 MG/1
TABLET ORAL
Qty: 1 DOSE PACK | Refills: 0 | Status: SHIPPED | OUTPATIENT
Start: 2020-08-19 | End: 2020-09-03

## 2020-08-19 NOTE — PROGRESS NOTES
Chief Complaint   Patient presents with    Headache    Sinus Pain     Near the eyes   1. Have you been to the ER, urgent care clinic since your last visit? Hospitalized since your last visit? Yes, 07/2020 [facility], left foot injury. 2. Have you seen or consulted any other health care providers outside of the [address] [place] [address] since your last visit? Include any pap smears or colon screening.  No

## 2020-08-19 NOTE — PROGRESS NOTES
Angelika Carlton is a 41 y.o. male who was seen by synchronous (real-time) audio-video technology on 8/19/2020. Consent: Angelika Carlton, who was seen by synchronous (real-time) audio-video technology, and/or his healthcare decision maker, is aware that this patient-initiated, Telehealth encounter on 8/19/2020 is a billable service, with coverage as determined by his insurance carrier. He is aware that he may receive a bill and has provided verbal consent to proceed: Yes. Assessment & Plan:   1. Migraine with aura and with status migrainosus, not intractable  Trail on anti inflammatory otc excedrin migraine  If not sufficient and persisting can trial steroid taper   Recommend see neuro if this recurs, new headache types are sometimes warranting evaluation            Pt was counseled on risks, benefits and alternatives of treatment options. All questions were asked and answered and the patient was agreeable with the treatment plan as outlined. Encounter time today was 15 minutes and more than 50% of this encounter was spent in counseling face-to-face regarding Diagnosis, Patient Education, Medication Management, Compliance and Impressions. Time documented includes face to face time, documentation and chart review if applicable except as noted. Subjective:   Angelika Carlton is a 41 y.o. male who was seen for Headache and Sinus Pain (Near the eyes x 3 days.)      Sunday he went camping and he suddenly had this blurry spot on his vision that went away after about 5 minutes and then he got a headache, he took tylenol that helped, he woke up the next day and it was back. He has a pressure feeling behind his eyes.     He called a tele doc they thought it was a migraine variant as well     Eyes not watering  Face feels like its flushing and sweating though  He feels like his pupils looked tiny when that was happening    He feels a little off balance, he feels like his judgement is a little cloudy. Medications, allergies, PMH, PSH, SOCH, [address] reviewed and updated per routine protocol, see chart for review and changes if not noted here. ROS  A 12 point review of systems was negative except as noted here or in the HPI.     Objective:   Vital Signs: (As obtained by patient/caregiver at home)  Patient-Reported Vitals 8/19/2020   Patient-Reported Weight 185lb   Patient-Reported Height -   Patient-Reported Pulse 61   Patient-Reported Temperature -   Patient-Reported Systolic  425   Patient-Reported Diastolic 79        [INSTRUCTIONS:  \"[x]\" Indicates a positive item  \"[]\" Indicates a negative item  -- DELETE ALL ITEMS NOT EXAMINED]    Constitutional: [x] Appears well-developed and well-nourished [x] No apparent distress      [] Abnormal -     Mental status: [x] Alert and awake  [x] Oriented to person/place/time [x] Able to follow commands    [] Abnormal -     Eyes:   EOM    [x]  Normal    [] Abnormal -   Sclera  [x]  Normal    [] Abnormal -          Discharge [x]  None visible   [] Abnormal -     HENT: [x] Normocephalic, atraumatic  [] Abnormal -   [x] Mouth/Throat: Mucous membranes are moist    External Ears [x] Normal  [] Abnormal -    Neck: [x] No visualized mass [] Abnormal -     Pulmonary/Chest: [x] Respiratory effort normal   [x] No visualized signs of difficulty breathing or respiratory distress        [] Abnormal -      Musculoskeletal:   [x] Normal gait with no signs of ataxia         [x] Normal range of motion of neck        [] Abnormal -     Neurological:        [x] No Facial Asymmetry (Cranial nerve 7 motor function) (limited exam due to video visit)          [x] No gaze palsy        [] Abnormal -          Skin:        [x] No significant exanthematous lesions or discoloration noted on facial skin         [] Abnormal -            Psychiatric:       [x] Normal Affect [] Abnormal -        [x] No Hallucinations    Other pertinent observable physical exam findings: no visible neurological deficit noted cn2-12 grossly intact    We discussed the expected course, resolution and complications of the diagnosis(es) in detail. Medication risks, benefits, costs, interactions, and alternatives were discussed as indicated. I advised him to contact the office if his condition worsens, changes or fails to improve as anticipated. He expressed understanding with the diagnosis(es) and plan. Annie Ramos is a 41 y.o. male who was evaluated by a video visit encounter for concerns as above. Patient identification was verified prior to start of the visit. A caregiver was present when appropriate. Due to this being a TeleHealth encounter (During [facility]-57 public [facility] emergency), evaluation of the following organ systems was limited: Vitals/Constitutional/EENT/Resp/CV/GI//MS/Neuro/Skin/Heme-Lymph-Imm. Pursuant to the emergency declaration under the [facility], [facility] waiver authority and the [employer] and [employer] Act, this Virtual  Visit was conducted, with patient's (and/or legal guardian's) consent, to reduce the patient's risk of exposure to COVID-19 and provide necessary medical care. Services were provided through a video synchronous discussion virtually to substitute for in-person clinic visit. Patient and provider were located at their individual homes. Abbie Burgess MD  [facility]  08/19/20 2:42 PM     Portions of this note may have been populated using smart dictation software and may have \"sounds-like\" errors present.

## 2020-09-03 ENCOUNTER — VIRTUAL VISIT (OUTPATIENT)
Dept: FAMILY MEDICINE CLINIC | Age: 40
End: 2020-09-03
Payer: COMMERCIAL

## 2020-09-03 DIAGNOSIS — G44.52 NEW DAILY PERSISTENT HEADACHE: ICD-10-CM

## 2020-09-03 DIAGNOSIS — G43.101 MIGRAINE WITH AURA AND WITH STATUS MIGRAINOSUS, NOT INTRACTABLE: Primary | ICD-10-CM

## 2020-09-03 PROCEDURE — 99214 OFFICE O/P EST MOD 30 MIN: CPT | Performed by: FAMILY MEDICINE

## 2020-09-03 RX ORDER — PROMETHAZINE HYDROCHLORIDE 25 MG/1
25 TABLET ORAL
Qty: 30 TAB | Refills: 0 | Status: SHIPPED | OUTPATIENT
Start: 2020-09-03

## 2020-09-03 RX ORDER — KETOROLAC TROMETHAMINE 10 MG/1
10 TABLET, FILM COATED ORAL
Qty: 9 TAB | Refills: 1 | Status: SHIPPED | OUTPATIENT
Start: 2020-09-03

## 2020-09-03 NOTE — PROGRESS NOTES
Chance Rhodes is a 41 y.o. male who was seen by synchronous (real-time) audio-video technology on 9/3/2020. Consent: Chance Rhodes, who was seen by synchronous (real-time) audio-video technology, and/or his healthcare decision maker, is aware that this patient-initiated, Telehealth encounter on 9/3/2020 is a billable service, with coverage as determined by his insurance carrier. He is aware that he may receive a bill and has provided verbal consent to proceed: Yes. Assessment & Plan:   1. Migraine with aura and with status migrainosus, not intractable    2. New daily persistent headache  Recommend because it as not as clear cut of a headache picture and failed multiple trials thus far  Recommend ok to try toradol/phenergan  Ref neuro  - REFERRAL TO NEUROLOGY          Pt was counseled on risks, benefits and alternatives of treatment options. All questions were asked and answered and the patient was agreeable with the treatment plan as outlined. Encounter time today was 26 minutes and more than 50% of this encounter was spent in counseling face-to-face regarding Diagnosis, Patient Education, Medication Management, Compliance and Impressions. Time documented includes face to face time, documentation and chart review if applicable except as noted. Subjective:   Chance Rhodes is a 41 y.o. male who was seen for Headache and Sinus Pain    Since our last visit he had a teledoc visit and he kept having sinus symptoms and he was prescribed amox--it didn't make any different in his symptoms he thinks.  He felt better at the beach certainly but also was having fun and a lower stress scenario    He took the steroid and he felt very emotional and some highs and lows and then down at the beach his R nostril was jammed and his eyes were \"super red\" and he apparently \"looked sick\"  He wonders if in hindsight this is ongoing for longer than he thought it would have been     Woke up Tuesday and he was having some flashers/floaters and he could see it with his eyes closed as well--he thought it might be an aura actually    Worsening headaches as the day goes on    Went to the gym today and was doing ok--he's not debilitated but it's definitely impactful and annoying and it is \"always there\" and it is sapping his energy which is feeling relatively frustrated. For brief periods of time he is feeling normal     Rare behind his eyes he's having sharp pains     Medications, allergies, PMH, PSH, SOCH, [address] reviewed and updated per routine protocol, see chart for review and changes if not noted here. ROS  A 12 point review of systems was negative except as noted here or in the HPI.     Objective:   Vital Signs: (As obtained by patient/caregiver at home)  Patient-Reported Vitals 9/3/2020   Patient-Reported Weight 185   Patient-Reported Height 59   Patient-Reported Pulse 55   Patient-Reported Temperature 98.3   Patient-Reported SpO2 97   Patient-Reported Systolic  168   Patient-Reported Diastolic 85        [INSTRUCTIONS:  \"[x]\" Indicates a positive item  \"[]\" Indicates a negative item  -- DELETE ALL ITEMS NOT EXAMINED]    Constitutional: [x] Appears well-developed and well-nourished [x] No apparent distress      [] Abnormal -     Mental status: [x] Alert and awake  [x] Oriented to person/place/time [x] Able to follow commands    [] Abnormal -     Eyes:   EOM    [x]  Normal    [] Abnormal -   Sclera  [x]  Normal    [] Abnormal -          Discharge [x]  None visible   [] Abnormal -     HENT: [x] Normocephalic, atraumatic  [] Abnormal -   [x] Mouth/Throat: Mucous membranes are moist    External Ears [x] Normal  [] Abnormal -    Neck: [x] No visualized mass [] Abnormal -     Pulmonary/Chest: [x] Respiratory effort normal   [x] No visualized signs of difficulty breathing or respiratory distress        [] Abnormal -      Musculoskeletal:   [x] Normal gait with no signs of ataxia         [x] Normal range of motion of neck        [] Abnormal -     Neurological:        [x] No Facial Asymmetry (Cranial nerve 7 motor function) (limited exam due to video visit)          [x] No gaze palsy        [] Abnormal -          Skin:        [x] No significant exanthematous lesions or discoloration noted on facial skin         [] Abnormal -            Psychiatric:       [x] Normal Affect [] Abnormal -        [x] No Hallucinations    Other pertinent observable physical exam findings:no facial droop, cn 2-12 grossly intact    We discussed the expected course, resolution and complications of the diagnosis(es) in detail. Medication risks, benefits, costs, interactions, and alternatives were discussed as indicated. I advised him to contact the office if his condition worsens, changes or fails to improve as anticipated. He expressed understanding with the diagnosis(es) and plan. Corazon Hair is a 41 y.o. male who was evaluated by a video visit encounter for concerns as above. Patient identification was verified prior to start of the visit. A caregiver was present when appropriate. Due to this being a TeleHealth encounter (During [facility]- public health emergency), evaluation of the following organ systems was limited: Vitals/Constitutional/EENT/Resp/CV/GI//MS/Neuro/Skin/Heme-Lymph-Imm. Pursuant to the emergency declaration under the [facility], 1135 waiver authority and the [employer] Act, this Virtual  Visit was conducted, with patient's (and/or legal guardian's) consent, to reduce the patient's risk of exposure to COVID-19 and provide necessary medical care. Services were provided through a video synchronous discussion virtually to substitute for in-person clinic visit. Patient and provider were located at their individual homes.       Irena Castellano MD  [facility]  09/03/20 1:32 PM     Portions of this note may have been populated using smart dictation software and may have \"sounds-like\" errors present.

## 2020-09-15 ENCOUNTER — OFFICE VISIT (OUTPATIENT)
Dept: NEUROLOGY | Age: 40
End: 2020-09-15
Payer: COMMERCIAL

## 2020-09-15 VITALS
SYSTOLIC BLOOD PRESSURE: 132 MMHG | TEMPERATURE: 96.1 F | HEART RATE: 69 BPM | HEIGHT: 69 IN | OXYGEN SATURATION: 99 % | WEIGHT: 190.5 LBS | DIASTOLIC BLOOD PRESSURE: 86 MMHG | RESPIRATION RATE: 20 BRPM | BODY MASS INDEX: 28.22 KG/M2

## 2020-09-15 DIAGNOSIS — G43.109 MIGRAINE WITH AURA AND WITHOUT STATUS MIGRAINOSUS, NOT INTRACTABLE: ICD-10-CM

## 2020-09-15 DIAGNOSIS — H57.13 EYE PAIN, BILATERAL: Primary | ICD-10-CM

## 2020-09-15 DIAGNOSIS — H53.9 VISION CHANGES: ICD-10-CM

## 2020-09-15 PROCEDURE — 99244 OFF/OP CNSLTJ NEW/EST MOD 40: CPT | Performed by: PSYCHIATRY & NEUROLOGY

## 2020-09-15 RX ORDER — BUTALBITAL, ACETAMINOPHEN AND CAFFEINE 50; 325; 40 MG/1; MG/1; MG/1
1 TABLET ORAL
Qty: 40 TAB | Refills: 0 | Status: SHIPPED | OUTPATIENT
Start: 2020-09-15

## 2020-09-15 NOTE — PROGRESS NOTES
Headaches- started about 1 month ago  August 16th- no idea what triggered   Auros with them- sensitive to the light  No idea on time of day  Blurry vision seems like its always on the right side   Nausea with them denies any vomitting   He used a 5 day steroid, excedrin migraine seemed to work pretty good but made him jittery  Ibuprofen, tylenol OTC     The sharp pain seems to be behind his eyes     Eyes gets very puffy- sinus related?    Tingling around his eyes

## 2020-09-15 NOTE — PROGRESS NOTES
NEUROLOGY CLINIC NOTE    Patient ID:  Ryland Bailey  [id number]  84 y.o.  1980    Date of Consultation:  September 15, 2020    Referring Physician: Dr. Lavonne Mendes    Reason for Consultation:  Headaches    Chief Complaint   Patient presents with    New Patient     headaches        History of Present Illness:     Patient Active Problem List    Diagnosis Date Noted    Benign essential HTN 2019    Allergic rhinitis 2019     Past Medical History:   Diagnosis Date    Headache     Hypertension       Past Surgical History:   Procedure Laterality Date    HX VASECTOMY        Prior to Admission medications    Medication Sig Start Date End Date Taking? Authorizing Provider   metoprolol succinate (TOPROL-XL) 50 mg XL tablet Take 1 Tab by mouth daily. 20  Yes Chiquita Trimble MD   lisinopriL (PRINIVIL, ZESTRIL) 10 mg tablet Take 1 Tab by mouth daily. 20  Yes Cihquita Trimble MD   loratadine (CLARITIN) 10 mg tablet Take 1 Tab by mouth daily. 20  Yes Chiquita Trimble MD   fluticasone propionate (FLONASE) 50 mcg/actuation nasal spray USE 2 SPRAYS IN [facility] NOSTRIL ONE TIME A DAY 20  Yes Chiquita Trimble MD   promethazine (PHENERGAN) 25 mg tablet Take 1 Tab by mouth every six (6) hours as needed for Nausea. 9/3/20   Chiquita Trimble MD   ketorolac (TORADOL) 10 mg tablet Take 1 Tab by mouth every six (6) hours as needed for Pain. 9/3/20   Chiquita Trimble MD     No Known Allergies   Social History     Tobacco Use    Smoking status: Former Smoker     Last attempt to quit: 2015     Years since quittin.1    Smokeless tobacco: Former User   Substance Use Topics    Alcohol use:  Yes     Alcohol/week: 10.0 standard drinks     Types: 10 Cans of beer per week      Family History   Problem Relation Age of Onset    Hypertension Mother     Stroke Mother     Hypertension Father     Diabetes Maternal Grandmother     Stroke Maternal Grandmother     Cancer Paternal Grandmother     Dementia Paternal Grandfather         Subjective:      Kaylin Triana is a 41 y.o. RHWM with history of hypertension and allergic rhinitis who was referred here by Robinson Taylor for new onset headache and vision changes. Per patient he recalls an episode 1 1/2 and 2 years PTC while driving of abrupt onset of loss of his peripheral vision and then developed a generalized headache. He went home and took OTC medication with relief. Then last 8/16/2020 while camping he had a abrupt onset of seeing a small blurry spot on the right side of his field of vision that had no changes whether his eyes were open or close, whether he covered either eye. This was followed several minutes after by a headache. Pressure behind his eye or episodic sharp pain in the eye associated with his eyes looking glazed, sometimes burning or red. Associated with sensitivity to light (sees colors all the time) and nausea. At its worst it was a 6/10. No relief with OTC medication. Headache was lingering so he had a virtual visit with his PCP was 8/19/2020 and was prescribed steroids with no benefit. PCP note mentions same description of the headache as above but there was a complaint of balance issue and his judgment was a little bit cloudy. Then help on 8/25/2020 he noted a lot of sense of congestion and was prescribed Amoxicillin with no benefit for the congestion or the headache. Then 9/1/2020 he had another episode of the aura in the morning and afternoon followed by the headache. Another virtual visit with his PCP 9/3/2020. Prescribed ketorolac and promethazine which he did not fill. He went and saw a optometrist and review of those records revealed mild issues with reading on near vision and was prescribed glasses. Another episode 9/10/2020 at noon while in a store and 9/11/2020.      Right side field of vision - started a small blurry spot  Gradual onset of headache and at its worst a 6/10 - pressure behind the eye or sharp pain  lingered   Eyes looked glazed, burning and red  Associated with light sensitivity (sees colors all the time) and nausea  No difference with covering either eye or when closing the eyes  Lasts for 15 to 20 minutes. But lingering milder headache for a few days  Still would have brief sharp pains mainly behind the right eye  Woke up with it twice  Headache free days for 1 to 2 days  Last headache was 9/11  No known precipitant or worsening factors. Resting in dark room may help    Outside reports reviewed: office notes. Review of Systems:    A comprehensive review of systems was performed:   Constitutional: positive for fatigue  Eyes: positive for visual disturbance  Ears, nose, mouth, throat, and face: positive for ringing in the ears  Respiratory: positive for none  Cardiovascular: positive for none  Gastrointestinal: positive for nausea  Genitourinary: positive for none  Integument/breast: positive for none  Hematologic/lymphatic: positive for none  Musculoskeletal: positive for none  Neurological: positive for headaches  Behavioral/Psych: positive for anxiety  Endocrine: positive for none  Allergic/Immunologic: positive for none      Objective:     Visit Vitals  /86   Pulse 69   Temp (!) 96.1 °F (35.6 °C)   Resp 20   Ht 5' 9\" (1.753 m)   Wt 86.4 kg (190 lb 8 oz)   SpO2 99%   BMI 28.13 kg/m²       PHYSICAL EXAM:    General Appearance: Alert, patient appears stated age. General:  Well developed, well nourished, patient in no apparent distress. Head/Face: The head is normocephalic and atraumatic. Eyes: Conjunctivae appear normal. Sclera appear normal and non-icteric. Nose (and Sinus):   No abnormality of the nose or sinuses is noted. Oral:   Throat is clear. Lymphatics:  No lymphadenopathy in the neck/head. Neck and Thyroid:   No bruits noted in the neck. Respiratory:  Lungs clear to auscultation. Cardiovascular:  Palpation and auscultation: regular rate and rhythm. Extremity: No joint swelling, erythema or pedal edema. NEUROLOGICAL EXAM:    Appearance: The patient is well developed, well nourished, provides a coherent history and is in no acute distress. Mental Status: Oriented to time, place and person. Fluent, no aphasia or dysarthria. Mood and affect appropriate. Cranial Nerves:   Intact visual fields. VA 20/20 OU on near vision with correction. Fundi are benign. No papilledema. DC, EOM's full, no nystagmus, no ptosis. Facial sensation is normal. Corneal reflexes are intact. Facial movement is symmetric. Hearing is normal bilaterally. Palate is midline with normal elevation. Sternocleidomastoid and trapezius muscles are normal. Tongue is midline. Motor:  5/5 strength in upper and lower proximal and distal muscles. Normal bulk and tone. No fasciculations. No pronator drift. Reflexes:   Deep tendon reflexes 1+/4 and symmetrical. Downgoing toes. Sensory:   Normal to cold, pinprick and vibration. Gait:  Normal gait. No Romberg. Can do tandem walking. Tremor:   No tremor noted. Cerebellar:  Intact FTN/BALDOMERO/HTS. Neurovascular:  Normal heart sounds and regular rhythm, peripheral pulses intact, and no carotid bruits. Assessment:   Eye pain  Vision changes  Acute migraine with aura    Plan:   Neurological examination is currently nonfocal.  Visual acuity was 20/20 with correction and funduscopy does not reveal any evidence of papilledema. Of concern is this new onset of vision changes associated with ocular pain and headaches that are more bilateral rather than unilateral.  Need to also consider patient's age. Need to consider possible tumor involving the pituitary, vascular malformation or central demyelinating disease process. Less likely stroke. Brain MRI with and without contrast was ordered to further assess. Unclear yet whether patient may benefit from evaluation by neuro-ophthalmology.   Further intervention be done pending results of neuroimaging. Patient having loss of visual fields as well as light hypersensitivity and seeing colors. Given unremarkable eye exam, need to rule out a central process. Need to assess for possible optic neuropathy or again pituitary tumor involving optic chiasm. Need to also look for vascular malformation or central demyelinating disease process. Neuroimaging as above. Certainly this type of phenomenon could be aura associated with migraine headaches which the patient has not had any history of. Hence the need for neuroimaging. Trial of Fioricet for abortive therapy. Prescriptions provided. Hold off on trial of triptans after neuroimaging is done to make sure patient does not have any aneurysm or vascular malformations that puts him at risk for complications with triptans. Advised to take Fioricet as soon as patient has the visual aura and see if this helps. Further intervention from a headache standpoint will be done pending results of neuroimaging. All questions and concerns were answered. This note was created using voice recognition software. Despite editing, there may be syntax errors.

## 2020-09-15 NOTE — LETTER
9/15/20 Patient: Miriam Gaspar YOB: 1980 Date of Visit: 9/15/2020 Lakshmi Rausch MD 
[facility] [id number] VIA In Basket Dear Lakshmi Rausch MD, Thank you for referring Mr. Miriam Gaspar to [facility] for evaluation. My notes for this consultation are attached. If you have questions, please do not hesitate to call me. I look forward to following your patient along with you. Sincerely, Ivory Chirinos MD

## 2020-09-15 NOTE — PATIENT INSTRUCTIONS
PRESCRIPTION REFILL POLICY [facility] Statement to Patients April 1, 2014 In an effort to ensure the large volume of patient prescription refills is processed in the most efficient and expeditious manner, we are asking our patients to assist us by calling your Pharmacy for all prescription refills, this will include also your  Mail Order Pharmacy. The pharmacy will contact our office electronically to continue the refill process. Please do not wait until the last minute to call your pharmacy. We need at least 48 hours (2days) to fill prescriptions. We also encourage you to call your pharmacy before going to  your prescription to make sure it is ready. With regard to controlled substance prescription refill requests (narcotic refills) that need to be picked up at our office, we ask your cooperation by providing us with at least 72 hours (3days) notice that you will need a refill. We will not refill narcotic prescription refill requests after 4:00pm on any weekday, Monday through Thursday, or after 2:00pm on Fridays, or on the weekends. We encourage everyone to explore another way of getting your prescription refill request processed using [employer], our patient web portal through our electronic medical record system. [employer] is an efficient and effective way to communicate your medication request directly to the office and  downloadable as an marlon on your smart phone . [employer] also features a review functionality that allows you to view your medication list as well as leave messages for your physician. Are you ready to get connected? If so please review the attatched instructions or speak to any of our staff to get you set up right away! Thank you so much for your cooperation. Should you have any questions please contact our Practice Administrator. The Physicians and Staff,  [facility]

## 2020-09-18 ENCOUNTER — HOSPITAL ENCOUNTER (OUTPATIENT)
Dept: MRI IMAGING | Age: 40
Discharge: HOME | End: 2020-09-18
Attending: PSYCHIATRY & NEUROLOGY
Payer: COMMERCIAL

## 2020-09-18 VITALS — WEIGHT: 185 LBS | BODY MASS INDEX: 27.32 KG/M2

## 2020-09-18 DIAGNOSIS — H53.9 VISION CHANGES: ICD-10-CM

## 2020-09-18 DIAGNOSIS — H57.13 EYE PAIN, BILATERAL: ICD-10-CM

## 2020-09-18 DIAGNOSIS — G43.109 MIGRAINE WITH AURA AND WITHOUT STATUS MIGRAINOSUS, NOT INTRACTABLE: ICD-10-CM

## 2020-09-18 PROCEDURE — A9575 INJ GADOTERATE MEGLUMI 0.1ML: HCPCS | Performed by: RADIOLOGY

## 2020-09-18 PROCEDURE — 74011250636 HC RX REV CODE- 250/636: Performed by: RADIOLOGY

## 2020-09-18 PROCEDURE — 70553 MRI BRAIN STEM W/O & W/DYE: CPT

## 2020-09-18 RX ORDER — GADOTERATE MEGLUMINE 376.9 MG/ML
16 INJECTION INTRAVENOUS
Status: COMPLETED | OUTPATIENT
Start: 2020-09-18 | End: 2020-09-18

## 2020-09-18 RX ADMIN — GADOTERATE MEGLUMINE 16 ML: 376.9 INJECTION INTRAVENOUS at 10:38

## 2020-10-01 ENCOUNTER — PATIENT MESSAGE (OUTPATIENT)
Dept: NEUROLOGY | Age: 40
End: 2020-10-01

## 2021-06-21 RX ORDER — LISINOPRIL 10 MG/1
TABLET ORAL
Qty: 90 TABLET | Refills: 3 | Status: SHIPPED | OUTPATIENT
Start: 2021-06-21 | End: 2022-06-28 | Stop reason: SDUPTHER

## 2021-06-21 RX ORDER — LORATADINE 10 MG/1
TABLET ORAL
Qty: 90 TABLET | Refills: 3 | Status: SHIPPED | OUTPATIENT
Start: 2021-06-21 | End: 2022-06-28 | Stop reason: SDUPTHER

## 2021-06-24 DIAGNOSIS — J30.9 ALLERGIC RHINITIS: ICD-10-CM

## 2021-06-24 RX ORDER — FLUTICASONE PROPIONATE 50 MCG
SPRAY, SUSPENSION (ML) NASAL
Qty: 3 BOTTLE | Refills: 3 | Status: SHIPPED | OUTPATIENT
Start: 2021-06-24 | End: 2022-06-28 | Stop reason: SDUPTHER

## 2021-07-06 RX ORDER — METOPROLOL SUCCINATE 50 MG/1
TABLET, EXTENDED RELEASE ORAL
Qty: 90 TABLET | Refills: 3 | Status: SHIPPED | OUTPATIENT
Start: 2021-07-06 | End: 2022-06-28 | Stop reason: SDUPTHER

## 2021-11-30 ENCOUNTER — TELEPHONE (OUTPATIENT)
Dept: FAMILY MEDICINE CLINIC | Age: 41
End: 2021-11-30

## 2021-11-30 NOTE — TELEPHONE ENCOUNTER
----- Message from Lyric Doran sent at 11/29/2021  4:27 PM EST -----  Subject: Referral Request    QUESTIONS   Reason for referral request? pt went to the ER and was told that he had   epididymitis this is the second time that he has had this so he wanting a   referral to get a 2nd opinion as well as some type of treatment please   follow up   Has the physician seen you for this condition before? No   Preferred Specialist (if applicable)? Do you already have an appointment scheduled? Additional Information for Provider?   ---------------------------------------------------------------------------  --------------  CALL BACK INFO  What is the best way for the office to contact you? OK to leave message on   voicemail  Preferred Call Back Phone Number?  [phone]

## 2021-11-30 NOTE — TELEPHONE ENCOUNTER
Called pt, and left a voicemessage, asking that he call the office back and schedule a follow up appointment to discuss concerns.

## 2022-03-18 PROBLEM — I10 BENIGN ESSENTIAL HTN: Status: ACTIVE | Noted: 2019-01-30

## 2022-03-19 PROBLEM — J30.9 ALLERGIC RHINITIS: Status: ACTIVE | Noted: 2019-01-30

## 2022-06-17 DIAGNOSIS — J30.9 ALLERGIC RHINITIS: ICD-10-CM

## 2022-06-17 RX ORDER — LISINOPRIL 10 MG/1
TABLET ORAL
Qty: 90 TABLET | Refills: 3 | OUTPATIENT
Start: 2022-06-17

## 2022-06-17 RX ORDER — METOPROLOL SUCCINATE 50 MG/1
TABLET, EXTENDED RELEASE ORAL
Qty: 90 TABLET | Refills: 3 | OUTPATIENT
Start: 2022-06-17

## 2022-06-17 RX ORDER — FLUTICASONE PROPIONATE 50 MCG
SPRAY, SUSPENSION (ML) NASAL
Refills: 3 | OUTPATIENT
Start: 2022-06-17

## 2022-06-17 RX ORDER — LORATADINE 10 MG/1
TABLET ORAL
Qty: 90 TABLET | Refills: 3 | OUTPATIENT
Start: 2022-06-17

## 2022-06-28 ENCOUNTER — OFFICE VISIT (OUTPATIENT)
Dept: FAMILY MEDICINE CLINIC | Age: 42
End: 2022-06-28
Payer: COMMERCIAL

## 2022-06-28 VITALS
HEIGHT: 69 IN | BODY MASS INDEX: 29.47 KG/M2 | WEIGHT: 199 LBS | SYSTOLIC BLOOD PRESSURE: 134 MMHG | TEMPERATURE: 98 F | HEART RATE: 77 BPM | OXYGEN SATURATION: 98 % | RESPIRATION RATE: 18 BRPM | DIASTOLIC BLOOD PRESSURE: 82 MMHG

## 2022-06-28 DIAGNOSIS — I10 BENIGN ESSENTIAL HTN: Primary | ICD-10-CM

## 2022-06-28 DIAGNOSIS — Z11.59 NEED FOR HEPATITIS C SCREENING TEST: ICD-10-CM

## 2022-06-28 DIAGNOSIS — J30.9 ALLERGIC RHINITIS: ICD-10-CM

## 2022-06-28 DIAGNOSIS — E78.5 HYPERLIPIDEMIA, UNSPECIFIED HYPERLIPIDEMIA TYPE: ICD-10-CM

## 2022-06-28 DIAGNOSIS — Z12.5 SCREENING FOR PROSTATE CANCER: ICD-10-CM

## 2022-06-28 PROCEDURE — 99214 OFFICE O/P EST MOD 30 MIN: CPT | Performed by: FAMILY MEDICINE

## 2022-06-28 RX ORDER — FLUTICASONE PROPIONATE 50 MCG
SPRAY, SUSPENSION (ML) NASAL
Qty: 1 EACH | Refills: 2 | Status: SHIPPED | OUTPATIENT
Start: 2022-06-28

## 2022-06-28 RX ORDER — METOPROLOL SUCCINATE 50 MG/1
50 TABLET, EXTENDED RELEASE ORAL DAILY
Qty: 90 TABLET | Refills: 3 | Status: SHIPPED | OUTPATIENT
Start: 2022-06-28

## 2022-06-28 RX ORDER — LISINOPRIL 10 MG/1
10 TABLET ORAL DAILY
Qty: 90 TABLET | Refills: 3 | Status: SHIPPED | OUTPATIENT
Start: 2022-06-28

## 2022-06-28 RX ORDER — LORATADINE 10 MG/1
10 TABLET ORAL DAILY
Qty: 90 TABLET | Refills: 3 | Status: SHIPPED | OUTPATIENT
Start: 2022-06-28

## 2022-06-28 NOTE — PROGRESS NOTES
Chief Complaint   Patient presents with    Medication Refill     hasnt been seen in a while noc at this time      1. \"Have you been to the ER, urgent care clinic since your last visit? Hospitalized since your last visit? \" No    2. \"Have you seen or consulted any other health care providers outside of the [address] [place] [address] since your last visit? \" No     3. For patients aged 39-70: Has the patient had a colonoscopy / FIT/ Cologuard? NA - based on age      If the patient is female:    4. For patients aged 41-77: Has the patient had a mammogram within the past 2 years? NA - based on age or sex      11. For patients aged 21-65: Has the patient had a pap smear?  NA - based on age or sex

## 2022-06-28 NOTE — PROGRESS NOTES
Family Medicine Follow-Up Progress Note  Patient: Fernando Castro  1980, 38 y.o., male  Encounter Date: 6/28/2022    ASSESSMENT & PLAN    ICD-10-CM ICD-9-CM    1. Benign essential HTN  I10 401.1 lisinopriL (PRINIVIL, ZESTRIL) 10 mg tablet      metoprolol succinate (TOPROL-XL) 50 mg XL tablet      CBC W/O DIFF      METABOLIC PANEL, COMPREHENSIVE      LIPID PANEL   2. Allergic rhinitis  J30.9 477.9 loratadine (CLARITIN) 10 mg tablet      fluticasone propionate (FLONASE) 50 mcg/actuation nasal spray   3. Need for hepatitis C screening test  Z11.59 V73.89 HEPATITIS C AB   4. Screening for prostate cancer  Z12.5 V76.44 PSA SCREENING (SCREENING)   5. Hyperlipidemia, unspecified hyperlipidemia type  V00.3 026.0 METABOLIC PANEL, COMPREHENSIVE      LIPID PANEL       Orders Placed This Encounter    HEPATITIS C AB - Future Default     Standing Status:   Future     Standing Expiration Date:   6/28/2023    CBC W/O DIFF     Standing Status:   Future     Standing Expiration Date:   6/43/0251    METABOLIC PANEL, COMPREHENSIVE     Standing Status:   Future     Standing Expiration Date:   6/28/2023    LIPID PANEL     Standing Status:   Future     Standing Expiration Date:   6/28/2023    PSA SCREENING (SCREENING)     Standing Status:   Future     Standing Expiration Date:   6/28/2023    lisinopriL (PRINIVIL, ZESTRIL) 10 mg tablet     Sig: Take 1 Tablet by mouth daily. Dispense:  90 Tablet     Refill:  3    loratadine (CLARITIN) 10 mg tablet     Sig: Take 1 Tablet by mouth daily. Dispense:  90 Tablet     Refill:  3    fluticasone propionate (FLONASE) 50 mcg/actuation nasal spray     Sig: USE 2 SPRAYS IN EACH NOSTRIL ONE TIME A DAY     Dispense:  1 Each     Refill:  2    metoprolol succinate (TOPROL-XL) 50 mg XL tablet     Sig: Take 1 Tablet by mouth daily.      Dispense:  90 Tablet     Refill:  3       Patient Instructions   bp at goal in office and home readings  C/w medications  Due for labs, please go for them this week  Refill claritin and flonase, stable  Routine screening tests ordered  Repeat fasting lipids, use ascvd to help with determination of next steps      CHIEF COMPLAINT  Chief Complaint   Patient presents with    Medication Refill     hasnt been seen in a while noc at this time        Yane Kendrick is a 38 y.o. male presenting today for follow up  HTN: patient reports stable on medications. No chest pain, sob, headache, blurred vision, leg swelling, diaphoresis, falls. Compliant with medications as prescribed. Is sometimes checking blood pressures at home and reports range is 120-130/80s. No significant hyper or hypotensive episodes that the patient reports today. Headaches come and go, he's charting them, still getting the, saw  neurologist, is getting a pretty significant visual aura  Aura lasts about 20 mins, he says it gets so bad he can't drive he has to pull over  He tells me they will go a few months without one and then they come in a burst    Sister has a holter on right now--shes 11 mo younger than him, he has rare palpitations but thinks that the metoprolol helps with that    Due for lab work, non fasting today        ROS  Review of Systems  A 12 point review of systems was negative except as noted here or in the HPI. OBJECTIVE  Visit Vitals  /82   Pulse 77   Temp 98 °F (36.7 °C) (Temporal)   Resp 18   Ht 5' 9\" (1.753 m)   Wt 199 lb (90.3 kg)   SpO2 98%   BMI 29.39 kg/m²       Physical Exam  Vitals reviewed. Constitutional:       General: He is not in acute distress. Appearance: Normal appearance. He is normal weight. He is not ill-appearing, toxic-appearing or diaphoretic. HENT:      Head: Normocephalic and atraumatic. Right Ear: External ear normal.      Left Ear: External ear normal.      Mouth/Throat:      Mouth: Mucous membranes are moist.   Eyes:      General: No scleral icterus. Cardiovascular:      Rate and Rhythm: Normal rate and regular rhythm. Heart sounds: No murmur heard. No friction rub. No gallop. Pulmonary:      Effort: Pulmonary effort is normal. No respiratory distress. Breath sounds: No stridor. No wheezing or rhonchi. Abdominal:      General: Bowel sounds are normal.      Palpations: Abdomen is soft. Musculoskeletal:      Right lower leg: No edema. Left lower leg: No edema. Skin:     Coloration: Skin is not jaundiced or pale. Findings: No bruising, erythema, lesion or rash. Neurological:      General: No focal deficit present. Mental Status: He is alert. Cranial Nerves: No cranial nerve deficit. Gait: Gait normal.   Psychiatric:         Mood and Affect: Mood normal.         Behavior: Behavior normal.         Thought Content: Thought content normal.         Judgment: Judgment normal.         No results found for any visits on 22. HISTORICAL  Reviewed and updated today, and as noted below:    Past Medical History:   Diagnosis Date    Headache     Hypertension      Past Surgical History:   Procedure Laterality Date    HX VASECTOMY       Family History   Problem Relation Age of Onset    Hypertension Mother     Stroke Mother     Hypertension Father     Diabetes Maternal Grandmother     Stroke Maternal Grandmother     Cancer Paternal Grandmother     Dementia Paternal Grandfather      Social History     Tobacco Use   Smoking Status Former Smoker    Quit date: 2015    Years since quittin.9   Smokeless Tobacco Former User     Social History     Socioeconomic History    Marital status:    Tobacco Use    Smoking status: Former Smoker     Quit date: 2015     Years since quittin.9    Smokeless tobacco: Former User   Substance and Sexual Activity    Alcohol use:  Yes     Alcohol/week: 10.0 standard drinks     Types: 10 Cans of beer per week    Sexual activity: Yes     Partners: Female     No Known Allergies    LAB REVIEW  Lab Results   Component Value Date/Time Sodium 139 06/17/2020 10:06 AM    Potassium 4.4 06/17/2020 10:06 AM    Chloride 105 06/17/2020 10:06 AM    CO2 27 06/17/2020 10:06 AM    Anion gap 7 06/17/2020 10:06 AM    Glucose 95 06/17/2020 10:06 AM    BUN 11 06/17/2020 10:06 AM    Creatinine 0.79 06/17/2020 10:06 AM    BUN/Creatinine ratio 14 06/17/2020 10:06 AM    GFR est AA >60 06/17/2020 10:06 AM    GFR est non-AA >60 06/17/2020 10:06 AM    Calcium 9.3 06/17/2020 10:06 AM    Bilirubin, total 0.5 06/17/2020 10:06 AM    Alk. phosphatase 65 06/17/2020 10:06 AM    Protein, total 7.5 06/17/2020 10:06 AM    Albumin 4.1 06/17/2020 10:06 AM    Globulin 3.4 06/17/2020 10:06 AM    A-G Ratio 1.2 06/17/2020 10:06 AM    ALT (SGPT) 34 06/17/2020 10:06 AM     Lab Results   Component Value Date/Time    WBC 5.5 06/17/2020 10:06 AM    HGB 15.0 06/17/2020 10:06 AM    HCT 44.9 06/17/2020 10:06 AM    PLATELET 464 86/72/9343 10:06 AM    MCV 95.3 06/17/2020 10:06 AM     No results found for: HBA1C, GKR0MRLL, LLN0WJJZ, ADU8RQZX  Lab Results   Component Value Date/Time    Cholesterol, total 215 (H) 06/17/2020 10:06 AM    HDL Cholesterol 59 06/17/2020 10:06 AM    LDL, calculated 137.6 (H) 06/17/2020 10:06 AM    VLDL, calculated 18.4 06/17/2020 10:06 AM    Triglyceride 92 06/17/2020 10:06 AM    CHOL/HDL Ratio 3.6 06/17/2020 10:06 AM           [facility] Hilda Brush [facility]  06/28/22 3:45 PM    Portions of this note may have been populated using smart dictation software and may have \"sounds-like\" errors present. Pt was counseled on risks, benefits and alternatives of treatment options. All questions were asked and answered and the patient was agreeable with the treatment plan as outlined.

## 2022-06-28 NOTE — PATIENT INSTRUCTIONS
bp at goal in office and home readings  C/w medications  Due for labs, please go for them this week  Refill claritin and flonase, stable  Routine screening tests ordered  Repeat fasting lipids, use ascvd to help with determination of next steps

## 2022-06-29 DIAGNOSIS — I10 BENIGN ESSENTIAL HTN: ICD-10-CM

## 2022-06-29 DIAGNOSIS — Z12.5 SCREENING FOR PROSTATE CANCER: ICD-10-CM

## 2022-06-29 DIAGNOSIS — Z11.59 NEED FOR HEPATITIS C SCREENING TEST: ICD-10-CM

## 2022-06-29 DIAGNOSIS — E78.5 HYPERLIPIDEMIA, UNSPECIFIED HYPERLIPIDEMIA TYPE: ICD-10-CM

## 2022-06-29 LAB
ALBUMIN SERPL-MCNC: 4 G/DL (ref 3.5–5)
ALBUMIN/GLOB SERPL: 1.2 {RATIO} (ref 1.1–2.2)
ALP SERPL-CCNC: 50 U/L (ref 45–117)
ALT SERPL-CCNC: 36 U/L (ref 12–78)
ANION GAP SERPL CALC-SCNC: 6 MMOL/L (ref 5–15)
AST SERPL-CCNC: 25 U/L (ref 15–37)
BILIRUB SERPL-MCNC: 0.5 MG/DL (ref 0.2–1)
BUN SERPL-MCNC: 8 MG/DL (ref 6–20)
BUN/CREAT SERPL: 10 (ref 12–20)
CALCIUM SERPL-MCNC: 9.5 MG/DL (ref 8.5–10.1)
CHLORIDE SERPL-SCNC: 104 MMOL/L (ref 97–108)
CHOLEST SERPL-MCNC: 216 MG/DL
CO2 SERPL-SCNC: 30 MMOL/L (ref 21–32)
CREAT SERPL-MCNC: 0.78 MG/DL (ref 0.7–1.3)
ERYTHROCYTE [DISTWIDTH] IN BLOOD BY AUTOMATED COUNT: 13.1 % (ref 11.5–14.5)
GLOBULIN SER CALC-MCNC: 3.4 G/DL (ref 2–4)
GLUCOSE SERPL-MCNC: 102 MG/DL (ref 65–100)
HCT VFR BLD AUTO: 43.3 % (ref 36.6–50.3)
HCV AB SERPL QL IA: NONREACTIVE
HDLC SERPL-MCNC: 72 MG/DL
HDLC SERPL: 3 {RATIO} (ref 0–5)
HGB BLD-MCNC: 14.3 G/DL (ref 12.1–17)
LDLC SERPL CALC-MCNC: 121.8 MG/DL (ref 0–100)
MCH RBC QN AUTO: 31.7 PG (ref 26–34)
MCHC RBC AUTO-ENTMCNC: 33 G/DL (ref 30–36.5)
MCV RBC AUTO: 96 FL (ref 80–99)
NRBC # BLD: 0 K/UL (ref 0–0.01)
NRBC BLD-RTO: 0 PER 100 WBC
PLATELET # BLD AUTO: 233 K/UL (ref 150–400)
PMV BLD AUTO: 9.5 FL (ref 8.9–12.9)
POTASSIUM SERPL-SCNC: 4.7 MMOL/L (ref 3.5–5.1)
PROT SERPL-MCNC: 7.4 G/DL (ref 6.4–8.2)
PSA SERPL-MCNC: 0.9 NG/ML (ref 0.01–4)
RBC # BLD AUTO: 4.51 M/UL (ref 4.1–5.7)
SODIUM SERPL-SCNC: 140 MMOL/L (ref 136–145)
TRIGL SERPL-MCNC: 111 MG/DL (ref ?–150)
VLDLC SERPL CALC-MCNC: 22.2 MG/DL
WBC # BLD AUTO: 4.4 K/UL (ref 4.1–11.1)

## 2023-05-17 RX ORDER — LISINOPRIL 10 MG/1
10 TABLET ORAL DAILY
COMMUNITY
Start: 2022-06-28 | End: 2023-06-15

## 2023-05-17 RX ORDER — FLUTICASONE PROPIONATE 50 MCG
SPRAY, SUSPENSION (ML) NASAL
COMMUNITY
Start: 2022-06-28

## 2023-05-17 RX ORDER — KETOROLAC TROMETHAMINE 10 MG/1
10 TABLET, FILM COATED ORAL EVERY 6 HOURS PRN
COMMUNITY
Start: 2020-09-03

## 2023-05-17 RX ORDER — LORATADINE 10 MG/1
10 TABLET ORAL DAILY
COMMUNITY
Start: 2022-06-28 | End: 2023-06-15

## 2023-05-17 RX ORDER — BUTALBITAL, ACETAMINOPHEN AND CAFFEINE 50; 325; 40 MG/1; MG/1; MG/1
1 TABLET ORAL EVERY 6 HOURS PRN
COMMUNITY
Start: 2020-09-15

## 2023-05-17 RX ORDER — PROMETHAZINE HYDROCHLORIDE 25 MG/1
25 TABLET ORAL EVERY 6 HOURS PRN
COMMUNITY
Start: 2020-09-03

## 2023-05-17 RX ORDER — METOPROLOL SUCCINATE 50 MG/1
50 TABLET, EXTENDED RELEASE ORAL DAILY
COMMUNITY
Start: 2022-06-28 | End: 2023-06-15

## 2023-06-15 RX ORDER — METOPROLOL SUCCINATE 50 MG/1
TABLET, EXTENDED RELEASE ORAL
Qty: 90 TABLET | Refills: 1 | Status: SHIPPED | OUTPATIENT
Start: 2023-06-15

## 2023-06-15 RX ORDER — LORATADINE 10 MG/1
TABLET ORAL
Qty: 90 TABLET | Refills: 3 | Status: SHIPPED | OUTPATIENT
Start: 2023-06-15

## 2023-06-15 RX ORDER — LISINOPRIL 10 MG/1
TABLET ORAL
Qty: 90 TABLET | Refills: 1 | Status: SHIPPED | OUTPATIENT
Start: 2023-06-15

## 2023-06-15 NOTE — TELEPHONE ENCOUNTER
Patient last seen 1 year ago  Please offer appt so we can continue to provide care and refills  No further fills beyond this without in office visit  Thank you

## 2023-12-09 ENCOUNTER — TELEPHONE (OUTPATIENT)
Facility: CLINIC | Age: 43
End: 2023-12-09

## 2023-12-11 RX ORDER — LISINOPRIL 10 MG/1
TABLET ORAL
Qty: 90 TABLET | Refills: 1 | OUTPATIENT
Start: 2023-12-11

## 2023-12-11 RX ORDER — METOPROLOL SUCCINATE 50 MG/1
TABLET, EXTENDED RELEASE ORAL
Qty: 90 TABLET | Refills: 1 | OUTPATIENT
Start: 2023-12-11

## 2023-12-11 NOTE — TELEPHONE ENCOUNTER
Called pt and scheduled for first available in office appointment with Dr. Tez Grubbs for 2/13/23 at 10:45 am.  Pt asking if Dr. Tez Grubbs can approve refill to last until his appointment and is willing to come sooner if cancellation available.   Lynda

## 2023-12-12 RX ORDER — METOPROLOL SUCCINATE 50 MG/1
50 TABLET, EXTENDED RELEASE ORAL DAILY
Qty: 90 TABLET | Refills: 0 | Status: SHIPPED | OUTPATIENT
Start: 2023-12-12

## 2023-12-12 RX ORDER — LISINOPRIL 10 MG/1
10 TABLET ORAL DAILY
Qty: 90 TABLET | Refills: 0 | Status: SHIPPED | OUTPATIENT
Start: 2023-12-12

## 2024-02-13 ENCOUNTER — TELEPHONE (OUTPATIENT)
Facility: CLINIC | Age: 44
End: 2024-02-13

## 2024-02-13 NOTE — PROGRESS NOTES
24    Elie Pina is a 43 y.o. male who presents for follow up on HTN.  Mr. Pina is a patient of Dr. Severino, new to this provider.  It appears he has not been seen since 2022. Patient's blood pressure had previously been controlled with lisinopril 10 mg and metoprolol 50 mg XL. His BP is slightly elevated today. He admits he does not check his pressure at home.     Pt has a hx of migraine with aura since . He had an MRI a few years ago without acute findings. He presents with a detailed log and it seems he goes through periods where he has several and then none for months. He describes the pain as dull in nature. He has tried fioricet in the past without relief. He tried a friend's nurtec which he felt worked very well. He takes excedrin and coffee.     Lives with wife and kids, ages 17 and 9.   ?  Employment: sales  Exercise: goes to the gym infrequently  Diet: mostly homecooked; lunches typically take out  Water: lots!  Caffeine: coffee in AM, tea at night; denies sodas    Tobacco Use      Smoking status: Former        Packs/day: 0.00        Types: Cigarettes        Quit date: 2015        Years since quittin.5      Smokeless tobacco: Former  ETOH: Socially, on weekends  Stress: gym, sports with son  ?Last eye exam: two weeks ago  ?Last dental exam: Dr Steward  ?Colonoscopy: never    Patient Active Problem List   Diagnosis    Essential (primary) hypertension    Allergic rhinitis    Migraine with aura and without status migrainosus, not intractable    Encounter for general adult medical examination with abnormal findings    Hyperlipidemia    Impaired fasting blood sugar       Review of Systems  Review of Systems - negative except as listed above in the HPI    Current Outpatient Medications   Medication Sig Dispense Refill    lisinopril (PRINIVIL;ZESTRIL) 10 MG tablet Take 1 tablet by mouth daily 30 tablet 0    metoprolol succinate (TOPROL XL) 50 MG extended release tablet Take 1

## 2024-02-13 NOTE — TELEPHONE ENCOUNTER
----- Message from Merna Valverde sent at 2/13/2024  9:47 AM EST -----  Subject: Appointment Request    Reason for Call: Established Patient Appointment needed: Routine Existing   Condition Follow Up    QUESTIONS    Reason for appointment request? No appointments available during search     Additional Information for Provider? Needed reschedule due to emergency,   but next appt is in may, pt needs appt before then wants to know if it is   possible to come in and have blood work done and do a virtual visit in   order to get medications please call pt if appt becomes available   Wednesdays work best can do another if needed pt ok with seeing another   provider   ---------------------------------------------------------------------------  --------------  CALL BACK INFO  [phone]; OK to leave message on voicemail,OK to respond with electronic   message via [employer] portal (only for patients who have registered [employer]   account)  ---------------------------------------------------------------------------  --------------  SCRIPT ANSWERS

## 2024-02-14 ENCOUNTER — OFFICE VISIT (OUTPATIENT)
Facility: CLINIC | Age: 44
End: 2024-02-14
Payer: COMMERCIAL

## 2024-02-14 VITALS
HEIGHT: 69 IN | WEIGHT: 198 LBS | BODY MASS INDEX: 29.33 KG/M2 | DIASTOLIC BLOOD PRESSURE: 99 MMHG | SYSTOLIC BLOOD PRESSURE: 138 MMHG | HEART RATE: 78 BPM | OXYGEN SATURATION: 98 % | TEMPERATURE: 97.6 F | RESPIRATION RATE: 20 BRPM

## 2024-02-14 DIAGNOSIS — Z12.11 SCREENING FOR MALIGNANT NEOPLASM OF COLON: ICD-10-CM

## 2024-02-14 DIAGNOSIS — R73.01 IMPAIRED FASTING BLOOD SUGAR: ICD-10-CM

## 2024-02-14 DIAGNOSIS — J30.9 ALLERGIC RHINITIS, UNSPECIFIED SEASONALITY, UNSPECIFIED TRIGGER: ICD-10-CM

## 2024-02-14 DIAGNOSIS — Z12.5 SCREENING FOR MALIGNANT NEOPLASM OF PROSTATE: ICD-10-CM

## 2024-02-14 DIAGNOSIS — I10 ESSENTIAL (PRIMARY) HYPERTENSION: ICD-10-CM

## 2024-02-14 DIAGNOSIS — E78.5 HYPERLIPIDEMIA, UNSPECIFIED HYPERLIPIDEMIA TYPE: ICD-10-CM

## 2024-02-14 DIAGNOSIS — G43.109 MIGRAINE WITH AURA AND WITHOUT STATUS MIGRAINOSUS, NOT INTRACTABLE: ICD-10-CM

## 2024-02-14 DIAGNOSIS — Z00.01 ENCOUNTER FOR GENERAL ADULT MEDICAL EXAMINATION WITH ABNORMAL FINDINGS: Primary | ICD-10-CM

## 2024-02-14 LAB
ALBUMIN SERPL-MCNC: 4.1 G/DL (ref 3.5–5)
ALBUMIN/GLOB SERPL: 1.1 (ref 1.1–2.2)
ALP SERPL-CCNC: 57 U/L (ref 45–117)
ALT SERPL-CCNC: 74 U/L (ref 12–78)
ANION GAP SERPL CALC-SCNC: 3 MMOL/L (ref 5–15)
AST SERPL-CCNC: 64 U/L (ref 15–37)
BILIRUB SERPL-MCNC: 0.8 MG/DL (ref 0.2–1)
BUN SERPL-MCNC: 9 MG/DL (ref 6–20)
BUN/CREAT SERPL: 10 (ref 12–20)
CALCIUM SERPL-MCNC: 9.4 MG/DL (ref 8.5–10.1)
CHLORIDE SERPL-SCNC: 103 MMOL/L (ref 97–108)
CHOLEST SERPL-MCNC: 247 MG/DL
CO2 SERPL-SCNC: 30 MMOL/L (ref 21–32)
CREAT SERPL-MCNC: 0.88 MG/DL (ref 0.7–1.3)
ERYTHROCYTE [DISTWIDTH] IN BLOOD BY AUTOMATED COUNT: 12.5 % (ref 11.5–14.5)
EST. AVERAGE GLUCOSE BLD GHB EST-MCNC: 91 MG/DL
GLOBULIN SER CALC-MCNC: 3.7 G/DL (ref 2–4)
GLUCOSE SERPL-MCNC: 95 MG/DL (ref 65–100)
HBA1C MFR BLD: 4.8 % (ref 4–5.6)
HCT VFR BLD AUTO: 45.9 % (ref 36.6–50.3)
HDLC SERPL-MCNC: 60 MG/DL
HDLC SERPL: 4.1 (ref 0–5)
HGB BLD-MCNC: 14.9 G/DL (ref 12.1–17)
LDLC SERPL CALC-MCNC: 150.2 MG/DL (ref 0–100)
MCH RBC QN AUTO: 31 PG (ref 26–34)
MCHC RBC AUTO-ENTMCNC: 32.5 G/DL (ref 30–36.5)
MCV RBC AUTO: 95.4 FL (ref 80–99)
NRBC # BLD: 0 K/UL (ref 0–0.01)
NRBC BLD-RTO: 0 PER 100 WBC
PLATELET # BLD AUTO: 237 K/UL (ref 150–400)
PMV BLD AUTO: 9.3 FL (ref 8.9–12.9)
POTASSIUM SERPL-SCNC: 4.1 MMOL/L (ref 3.5–5.1)
PROT SERPL-MCNC: 7.8 G/DL (ref 6.4–8.2)
PSA SERPL-MCNC: 1.3 NG/ML (ref 0.01–4)
RBC # BLD AUTO: 4.81 M/UL (ref 4.1–5.7)
SODIUM SERPL-SCNC: 136 MMOL/L (ref 136–145)
TRIGL SERPL-MCNC: 184 MG/DL
VLDLC SERPL CALC-MCNC: 36.8 MG/DL
WBC # BLD AUTO: 5.4 K/UL (ref 4.1–11.1)

## 2024-02-14 PROCEDURE — 3075F SYST BP GE 130 - 139MM HG: CPT | Performed by: FAMILY MEDICINE

## 2024-02-14 PROCEDURE — 3080F DIAST BP >= 90 MM HG: CPT | Performed by: FAMILY MEDICINE

## 2024-02-14 PROCEDURE — 99213 OFFICE O/P EST LOW 20 MIN: CPT | Performed by: FAMILY MEDICINE

## 2024-02-14 PROCEDURE — 99396 PREV VISIT EST AGE 40-64: CPT | Performed by: FAMILY MEDICINE

## 2024-02-14 RX ORDER — LORATADINE 10 MG/1
10 TABLET ORAL DAILY
Qty: 90 TABLET | Refills: 1 | Status: SHIPPED | OUTPATIENT
Start: 2024-02-14

## 2024-02-14 RX ORDER — LISINOPRIL 10 MG/1
10 TABLET ORAL DAILY
Qty: 30 TABLET | Refills: 0 | Status: SHIPPED | OUTPATIENT
Start: 2024-02-14

## 2024-02-14 RX ORDER — METOPROLOL SUCCINATE 50 MG/1
50 TABLET, EXTENDED RELEASE ORAL DAILY
Qty: 30 TABLET | Refills: 0 | Status: SHIPPED | OUTPATIENT
Start: 2024-02-14

## 2024-02-14 RX ORDER — FLUTICASONE PROPIONATE 50 MCG
SPRAY, SUSPENSION (ML) NASAL
Qty: 16 G | Refills: 2 | Status: SHIPPED | OUTPATIENT
Start: 2024-02-14

## 2024-02-14 RX ORDER — SUMATRIPTAN 50 MG/1
50 TABLET, FILM COATED ORAL
Qty: 9 TABLET | Refills: 1 | Status: SHIPPED | OUTPATIENT
Start: 2024-02-14 | End: 2024-02-14

## 2024-02-14 NOTE — PROGRESS NOTES
Chief Complaint   Patient presents with    Hypertension     Follow up     Discuss Labs     Pt is fasting     Headache     Discuss a new RX, not taking Firocet   Discuss Nurtec ODT

## 2024-02-14 NOTE — ASSESSMENT & PLAN NOTE
Borderline controlled, continue current medications, lifestyle modifications recommended, and return with BP diary.

## 2024-02-14 NOTE — PATIENT INSTRUCTIONS
Calcium supplementation: Encouraged 2-3 daily servings of low fat calcium rich foods such as cheese, milk and yogurt.  Diet: Encouraged high fiber, low fat and low sodium diet. Diet rich in fruits, vegetables and whole grains.  Exercise: Encouraged cardiovascular and weight bearing exercise most days.  Health: Encouraged eye annually and dental q6 months. Encouraged annual flu vaccines and Tdap q10 years. Colonoscopy beginning at age 50 years old (or earlier based on risk factors) and thereafter based on previous result and risk factors. Shingrix at age 60 and pneumonia vaccines at 65. Monitor skin for changes. Reviewed ABCDE's of skin evaluation. DEXA recommended postmenopausal.  Safety: seat belt - Discussed the important role of seat belts in protecting from injuries during an automobile accident  Sunscreen: Discussed the importance of using sunscreen to protect from the sun's harmful ultraviolet rays. Anyone can get skin cancer regardless of age, gender, or race! In fact, it's estimated that one in five Americans will develop skin cancer in their lifetime.

## 2024-02-14 NOTE — ASSESSMENT & PLAN NOTE
Healthy lifestyle discussed with patient, including routine vaccination, diet, vitamin supplement, exercise, non-smoking, safe sexual practices and reduction of stress. Assessment and plan reviewed with patient.

## 2024-02-15 NOTE — RESULT ENCOUNTER NOTE
Mr. Pina, the cholesterol looks quite a bit high than your check last year and the years before that. The liver numbers are also a bit high. Overall, you are probably getting a bit too much fat in the diet (red meats, alcohol, breaded/fried foods, potatoes, etc). I would work on trying to add greens and veggies to your meals, and probably cutting your portion sizes a bit. Let's talk more when I see you again soon - hope the pressure is coming down a bit! Many thanks, Corrina Cespedes [facility]-BC

## 2024-03-06 ENCOUNTER — TELEMEDICINE (OUTPATIENT)
Facility: CLINIC | Age: 44
End: 2024-03-06
Payer: COMMERCIAL

## 2024-03-06 DIAGNOSIS — R74.8 ELEVATED LIVER ENZYMES: ICD-10-CM

## 2024-03-06 DIAGNOSIS — E78.5 HYPERLIPIDEMIA, UNSPECIFIED HYPERLIPIDEMIA TYPE: ICD-10-CM

## 2024-03-06 DIAGNOSIS — I10 ESSENTIAL (PRIMARY) HYPERTENSION: Primary | ICD-10-CM

## 2024-03-06 DIAGNOSIS — G43.109 MIGRAINE WITH AURA AND WITHOUT STATUS MIGRAINOSUS, NOT INTRACTABLE: ICD-10-CM

## 2024-03-06 PROCEDURE — 99213 OFFICE O/P EST LOW 20 MIN: CPT | Performed by: FAMILY MEDICINE

## 2024-03-06 ASSESSMENT — PATIENT HEALTH QUESTIONNAIRE - PHQ9
SUM OF ALL RESPONSES TO PHQ QUESTIONS 1-9: 0
2. FEELING DOWN, DEPRESSED OR HOPELESS: 0
SUM OF ALL RESPONSES TO PHQ QUESTIONS 1-9: 0
1. LITTLE INTEREST OR PLEASURE IN DOING THINGS: 0
SUM OF ALL RESPONSES TO PHQ QUESTIONS 1-9: 0
SUM OF ALL RESPONSES TO PHQ QUESTIONS 1-9: 0
SUM OF ALL RESPONSES TO PHQ9 QUESTIONS 1 & 2: 0

## 2024-03-06 NOTE — PATIENT INSTRUCTIONS
A heart-healthy eating plan can help you manage your blood cholesterol levels; this matters because it can reduce your risk of heart disease and stroke! When choosing meat, stick to lean meats like chicken, fish, and turkey instead of red meat like beef. Pick meats that have less visible fat when in the grocery store or trim the fat before cooking. Cooking methods like broiling and baking are better than pan frying with oil. Limit processed meats such as sausage, bologna, salami and hot dogs. And try meatless meals featuring vegetables or beans. This does NOT mean your food has to be flavorless! Aneth with spices and herbs in your cooking that don't have added salt (basil with tomatoes, rosemary with peas, cauliflower and squash), oregano with zucchini). Pick lower fat dairy products (1% milk, non-fat yogurt and cheese). Adding fiber is a great way to actually lower your cholesterol as well (replace bread crumbs with oats, serve whole fruit at breakfast instead of juice, brown rice instead of white). One of my favorite FREE sites for meal planning that has real food: https://[email or web address]/meal-plans/

## 2024-03-06 NOTE — PROGRESS NOTES
Chief Complaint   Patient presents with    Migraine     Elie Pina is a 43 y.o. male who presents for a follow up on migraines. Patient was given a trial of Imitrex during his last appt. He has not had any episodes of migraines so has not needed Rx.      \"Have you been to the ER, urgent care clinic since your last visit?  Hospitalized since your last visit?\"    NO    “Have you seen or consulted any other health care providers outside of [facility] since your last visit?”    NO    “Have you had a colorectal cancer screening such as a colonoscopy/FIT/Cologuard?    NO was given order during last appt. Has not scheduled yet          
migraines.   He has been checking BP at home and reports range 123/81 to low 130s systolic. He continues to take his medications as directed and denies SE. He denies migraines since prior appt so has not yet tested imitrex.    Patient also noted to have elevated liver function on recent labs.  He reports he has been trying to make diet changes since prior appointment.    Review of Systems   All other systems reviewed and are negative.         Objective   Patient-Reported Vitals  Patient-Reported Systolic (Top): 135 mmHg  Patient-Reported Diastolic (Bottom): 85 mmHg  BP Observations: Yes, BP was taken on electronic monitoring device with digital readout  Patient-Reported Pulse: 68  Patient-Reported Temperature: 97.2  Patient-Reported Weight: 198  Patient-Reported Pulse Oximetry: 98       Physical Exam  [INSTRUCTIONS:  \"[x]\" Indicates a positive item  \"[]\" Indicates a negative item  -- DELETE ALL ITEMS NOT EXAMINED]    Constitutional: [x] Appears well-developed and well-nourished [x] No apparent distress      [] Abnormal -     Mental status: [x] Alert and awake  [x] Oriented to person/place/time [x] Able to follow commands    [] Abnormal -     Eyes:   EOM    [x]  Normal    [] Abnormal -   Sclera  [x]  Normal    [] Abnormal -          Discharge [x]  None visible   [] Abnormal -     HENT: [x] Normocephalic, atraumatic  [] Abnormal -   [x] Mouth/Throat: Mucous membranes are moist    External Ears [x] Normal  [] Abnormal -    Neck: [x] No visualized mass [] Abnormal -     Pulmonary/Chest: [x] Respiratory effort normal   [x] No visualized signs of difficulty breathing or respiratory distress        [] Abnormal -      Musculoskeletal:   [x] Normal gait with no signs of ataxia         [x] Normal range of motion of neck        [] Abnormal -     Neurological:        [x] No Facial Asymmetry (Cranial nerve 7 motor function) (limited exam due to video visit)          [x] No gaze palsy        [] Abnormal -          Skin:

## 2024-03-06 NOTE — ASSESSMENT & PLAN NOTE
Reviewed potential contributors, no acute symptoms today.  Reinforced low-fat diet changes for benefit of triglycerides as well as liver enzymes.  Reviewed EtOH reduction.  Will recheck over the summer after continued diet and lifestyle changes.

## 2024-03-06 NOTE — ASSESSMENT & PLAN NOTE
Borderline controlled, lifestyle modifications recommended and has triptan on hand if needed.  Reviewed to take at first sign of migraine.

## 2024-03-15 PROBLEM — Z00.01 ENCOUNTER FOR GENERAL ADULT MEDICAL EXAMINATION WITH ABNORMAL FINDINGS: Status: RESOLVED | Noted: 2024-02-14 | Resolved: 2024-03-15

## 2024-04-11 DIAGNOSIS — I10 ESSENTIAL (PRIMARY) HYPERTENSION: ICD-10-CM

## 2024-04-11 RX ORDER — METOPROLOL SUCCINATE 50 MG/1
50 TABLET, EXTENDED RELEASE ORAL DAILY
Qty: 90 TABLET | Refills: 0 | Status: SHIPPED | OUTPATIENT
Start: 2024-04-11

## 2024-04-11 RX ORDER — LISINOPRIL 10 MG/1
10 TABLET ORAL DAILY
Qty: 90 TABLET | Refills: 0 | Status: SHIPPED | OUTPATIENT
Start: 2024-04-11

## 2024-04-11 NOTE — TELEPHONE ENCOUNTER
[facility] [address] [place] [address] faxed refill request:    Metoprolol Succ ER 50 mg TAB    Sig: Take 1 tablet by mouth every day  Quantity: 30    Lisinopril 10 mg Tablet  Sig: Take 1 tablet by mouth every day  Quantity: 30     Ldm

## 2024-04-29 DIAGNOSIS — G43.109 MIGRAINE WITH AURA AND WITHOUT STATUS MIGRAINOSUS, NOT INTRACTABLE: ICD-10-CM

## 2024-04-29 RX ORDER — SUMATRIPTAN 50 MG/1
50 TABLET, FILM COATED ORAL
Qty: 9 TABLET | Refills: 3 | Status: SHIPPED | OUTPATIENT
Start: 2024-04-29 | End: 2024-04-29

## 2024-07-10 DIAGNOSIS — I10 ESSENTIAL (PRIMARY) HYPERTENSION: ICD-10-CM

## 2024-07-10 RX ORDER — METOPROLOL SUCCINATE 50 MG/1
50 TABLET, EXTENDED RELEASE ORAL DAILY
Qty: 90 TABLET | Refills: 1 | Status: SHIPPED | OUTPATIENT
Start: 2024-07-10

## 2024-07-10 RX ORDER — LISINOPRIL 10 MG/1
10 TABLET ORAL DAILY
Qty: 90 TABLET | Refills: 1 | Status: SHIPPED | OUTPATIENT
Start: 2024-07-10

## 2024-07-10 NOTE — TELEPHONE ENCOUNTER
[facility] faxed request  #[phone]    METOPROLOL SUCC ER 50MG TAB    Take 1 tablet by mouth every day    Qty 90    LISINOPRIL 10 MG TABLET    Take 1 tablet by mouth every day    Qty 90

## 2024-08-11 NOTE — PROGRESS NOTES
Family Medicine Follow-Up Progress Note   [facility]    Patient Elie Pina  1980, 44 y.o., male  Encounter Date 08/13/24    ASSESSMENT AND PLAN:     Assessment & Plan        Assessment & Plan  Essential (primary) hypertension  Elevated blood pressure readings were observed today, indicating a slight increase from the previous visit. The dosage of lisinopril will be increased to 30 mg daily. A prescription refill has been sent to [facility]'s pharmacy. The patient is advised to monitor blood pressure at home using a machine. If there are any issues with the new dosage, he can start by taking two of her current 10 mg tablets and report any concerns.    Orders:    lisinopril (PRINIVIL;ZESTRIL) 30 MG tablet; Take 1 tablet by mouth daily    Migraine with aura and without status migrainosus, not intractable  No recent episodes of migraine have been reported. The patient is currently taking metoprolol 50 mg once a day, which may help in preventing migraines. He is advised to continue the current medication regimen.         Elevated liver enzymes  Has not made lifestyle changes. Liver enzyme and triglyceride levels were slightly elevated during the last visit. A lab order has been given to recheck these levels. The patient is advised to monitor alcohol consumption, particularly on weekends.    Orders:    Hepatic Function Panel; Future    Hyperlipidemia, unspecified hyperlipidemia type   Unclear control, lifestyle modifications recommended and printed and provided labs    Orders:    Lipid Panel; Future    Microalbumin / Creatinine Urine Ratio; Future    Basic Metabolic Panel; Future    Hepatic Function Panel; Future    Insect bite of abdominal wall, initial encounter  The patient was informed that the bee sting could have been from a yellow jacket, which can cause skin swelling due to the venom injected. Continued application of Benadryl cream was recommended for symptom relief.

## 2024-08-13 ENCOUNTER — OFFICE VISIT (OUTPATIENT)
Facility: CLINIC | Age: 44
End: 2024-08-13
Payer: COMMERCIAL

## 2024-08-13 VITALS
OXYGEN SATURATION: 98 % | SYSTOLIC BLOOD PRESSURE: 153 MMHG | RESPIRATION RATE: 15 BRPM | WEIGHT: 202.5 LBS | BODY MASS INDEX: 29.99 KG/M2 | HEIGHT: 69 IN | DIASTOLIC BLOOD PRESSURE: 92 MMHG | TEMPERATURE: 97.3 F | HEART RATE: 61 BPM

## 2024-08-13 DIAGNOSIS — E78.5 HYPERLIPIDEMIA, UNSPECIFIED HYPERLIPIDEMIA TYPE: ICD-10-CM
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
## 2024-08-13 DIAGNOSIS — R74.8 ELEVATED LIVER ENZYMES: ICD-10-CM

## 2024-08-13 DIAGNOSIS — J30.9 ALLERGIC RHINITIS, UNSPECIFIED SEASONALITY, UNSPECIFIED TRIGGER: ICD-10-CM

## 2024-08-13 DIAGNOSIS — Z12.11 SCREENING FOR MALIGNANT NEOPLASM OF COLON: ICD-10-CM

## 2024-08-13 DIAGNOSIS — I10 ESSENTIAL (PRIMARY) HYPERTENSION: Primary | ICD-10-CM

## 2024-08-13 DIAGNOSIS — S30.861A INSECT BITE OF ABDOMINAL WALL, INITIAL ENCOUNTER: ICD-10-CM

## 2024-08-13 DIAGNOSIS — G43.109 MIGRAINE WITH AURA AND WITHOUT STATUS MIGRAINOSUS, NOT INTRACTABLE: ICD-10-CM

## 2024-08-13 LAB
ALBUMIN SERPL-MCNC: 3.9 G/DL (ref 3.5–5)
ALBUMIN/GLOB SERPL: 1.1 (ref 1.1–2.2)
ALP SERPL-CCNC: 55 U/L (ref 45–117)
ALT SERPL-CCNC: 74 U/L (ref 12–78)
ANION GAP SERPL CALC-SCNC: 4 MMOL/L (ref 5–15)
AST SERPL-CCNC: 46 U/L (ref 15–37)
BILIRUB DIRECT SERPL-MCNC: 0.2 MG/DL (ref 0–0.2)
BILIRUB SERPL-MCNC: 0.7 MG/DL (ref 0.2–1)
BUN SERPL-MCNC: 9 MG/DL (ref 6–20)
BUN/CREAT SERPL: 11 (ref 12–20)
CALCIUM SERPL-MCNC: 9.7 MG/DL (ref 8.5–10.1)
CHLORIDE SERPL-SCNC: 103 MMOL/L (ref 97–108)
CHOLEST SERPL-MCNC: 224 MG/DL
CO2 SERPL-SCNC: 31 MMOL/L (ref 21–32)
CREAT SERPL-MCNC: 0.8 MG/DL (ref 0.7–1.3)
CREAT UR-MCNC: 22 MG/DL
GLOBULIN SER CALC-MCNC: 3.4 G/DL (ref 2–4)
GLUCOSE SERPL-MCNC: 106 MG/DL (ref 65–100)
HDLC SERPL-MCNC: 57 MG/DL
HDLC SERPL: 3.9 (ref 0–5)
LDLC SERPL CALC-MCNC: 135.8 MG/DL (ref 0–100)
MICROALBUMIN UR-MCNC: <0.5 MG/DL
MICROALBUMIN/CREAT UR-RTO: <23 MG/G (ref 0–30)
POTASSIUM SERPL-SCNC: 4.6 MMOL/L (ref 3.5–5.1)
PROT SERPL-MCNC: 7.3 G/DL (ref 6.4–8.2)
SODIUM SERPL-SCNC: 138 MMOL/L (ref 136–145)
TRIGL SERPL-MCNC: 156 MG/DL
VLDLC SERPL CALC-MCNC: 31.2 MG/DL

## 2024-08-13 PROCEDURE — 3080F DIAST BP >= 90 MM HG: CPT | Performed by: FAMILY MEDICINE

## 2024-08-13 PROCEDURE — 3077F SYST BP >= 140 MM HG: CPT | Performed by: FAMILY MEDICINE

## 2024-08-13 PROCEDURE — 99214 OFFICE O/P EST MOD 30 MIN: CPT | Performed by: FAMILY MEDICINE

## 2024-08-13 RX ORDER — LISINOPRIL 30 MG/1
30 TABLET ORAL DAILY
Qty: 90 TABLET | Refills: 1 | Status: SHIPPED | OUTPATIENT
Start: 2024-08-13

## 2024-08-13 RX ORDER — FLUTICASONE PROPIONATE 50 MCG
SPRAY, SUSPENSION (ML) NASAL
Qty: 16 G | Refills: 2 | Status: SHIPPED | OUTPATIENT
Start: 2024-08-13

## 2024-08-13 NOTE — ASSESSMENT & PLAN NOTE
Elevated blood pressure readings were observed today, indicating a slight increase from the previous visit. The dosage of lisinopril will be increased to 30 mg daily. A prescription refill has been sent to [facility]'s pharmacy. The patient is advised to monitor blood pressure at home using a machine. If there are any issues with the new dosage, he can start by taking two of her current 10 mg tablets and report any concerns.    Orders:    lisinopril (PRINIVIL;ZESTRIL) 30 MG tablet; Take 1 tablet by mouth daily

## 2024-08-13 NOTE — PROGRESS NOTES
Chief Complaint   Patient presents with    Hypertension     Elie Pina is a 44 y.o. male who presents for a follow up on HTN.      \"Have you been to the ER, urgent care clinic since your last visit?  Hospitalized since your last visit?\"    NO    “Have you seen or consulted any other health care providers outside of [facility] since your last visit?”    NO    “Have you had a colorectal cancer screening such as a colonoscopy/FIT/Cologuard?    NO has order    No colonoscopy on file  No cologuard on file  No FIT/FOBT on file   No flexible sigmoidoscopy on file         Click Here for Release of Records Request

## 2024-08-13 NOTE — ASSESSMENT & PLAN NOTE
Fax verification from [facility] Has not made lifestyle changes. Liver enzyme and triglyceride levels were slightly elevated during the last visit. A lab order has been given to recheck these levels. The patient is advised to monitor alcohol consumption, particularly on weekends.    Orders:    Hepatic Function Panel; Future

## 2024-08-13 NOTE — ASSESSMENT & PLAN NOTE
Pt requests a refill of their medication, which has been sent.    Orders:    fluticasone (FLONASE) 50 MCG/ACT nasal spray; USE 2 SPRAYS IN EACH NOSTRIL ONE TIME A DAY

## 2024-08-13 NOTE — RESULT ENCOUNTER NOTE
Thanks for getting this done!  The liver number and cholesterol have improved a little bit but from the last time, still little higher than we would like.  I will continue to watch alcohol, trying to minimize your daily intake.  I would also continue to watch full fat dairy, red meats, greasy and fried foods.  Please keep me posted on that blood pressure! Many thanks, Corrina Cespedes [facility]-BC

## 2024-08-13 NOTE — ASSESSMENT & PLAN NOTE
No recent episodes of migraine have been reported. The patient is currently taking metoprolol 50 mg once a day, which may help in preventing migraines. He is advised to continue the current medication regimen.

## 2024-08-13 NOTE — ASSESSMENT & PLAN NOTE
Unclear control, lifestyle modifications recommended and printed and provided labs    Orders:    Lipid Panel; Future    Microalbumin / Creatinine Urine Ratio; Future    Basic Metabolic Panel; Future    Hepatic Function Panel; Future

## 2024-09-03 NOTE — PROGRESS NOTES
Elie Pina, was evaluated through a synchronous (real-time) audio-video encounter. The patient (or guardian if applicable) is aware that this is a billable service, which includes applicable co-pays. This Virtual Visit was conducted with patient's (and/or legal guardian's) consent. Patient identification was verified, and a caregiver was present when appropriate.   The patient was located at Home: [address] [id number]  Provider was located at Facility (Appt Dept): [address] [facility]  [address]  [place] [address]  Confirm you are appropriately licensed, registered, or certified to deliver care in the state where the patient is located as indicated above. If you are not or unsure, please re-schedule the visit: Yes, I confirm.     Elie Pina (: 1980) is a Established patient, presenting virtually for evaluation of the following:    ASSESSMENT & PLAN:  Below is the assessment and plan developed based on review of pertinent history, physical exam, labs, studies, and medications.  Assessment & Plan      Assessment & Plan  Essential (primary) hypertension   Chronic, at goal (stable), continue current treatment plan, medication adherence emphasized, and lifestyle modifications recommended         Migraine with aura and without status migrainosus, not intractable   Chronic, at goal (stable), continue current treatment plan         Elevated liver enzymes    Stable, will continue monitoring. Reports less ETOH.          Hyperlipidemia, unspecified hyperlipidemia type   Chronic, not at goal (unstable), improving, reinforced diet changes and lifestyle modifications recommended         Screening for malignant neoplasm of colon    Reinforced need for preventive screening.            Return in about 6 months (around 3/4/2025) for Hypertension.    Patient Instructions   Keep up the great work!    Patient's blood pressures are well-controlled. Recommend continue with same

## 2024-09-04 ENCOUNTER — TELEMEDICINE (OUTPATIENT)
Facility: CLINIC | Age: 44
End: 2024-09-04
Payer: COMMERCIAL

## 2024-09-04 DIAGNOSIS — Z12.11 SCREENING FOR MALIGNANT NEOPLASM OF COLON: ICD-10-CM

## 2024-09-04 DIAGNOSIS — E78.5 HYPERLIPIDEMIA, UNSPECIFIED HYPERLIPIDEMIA TYPE: ICD-10-CM

## 2024-09-04 DIAGNOSIS — R74.8 ELEVATED LIVER ENZYMES: ICD-10-CM

## 2024-09-04 DIAGNOSIS — I10 ESSENTIAL (PRIMARY) HYPERTENSION: Primary | ICD-10-CM

## 2024-09-04 DIAGNOSIS — G43.109 MIGRAINE WITH AURA AND WITHOUT STATUS MIGRAINOSUS, NOT INTRACTABLE: ICD-10-CM

## 2024-09-04 PROCEDURE — 99213 OFFICE O/P EST LOW 20 MIN: CPT | Performed by: FAMILY MEDICINE

## 2024-09-04 ASSESSMENT — PATIENT HEALTH QUESTIONNAIRE - PHQ9
2. FEELING DOWN, DEPRESSED OR HOPELESS: NOT AT ALL
SUM OF ALL RESPONSES TO PHQ QUESTIONS 1-9: 0
SUM OF ALL RESPONSES TO PHQ QUESTIONS 1-9: 0
1. LITTLE INTEREST OR PLEASURE IN DOING THINGS: NOT AT ALL
SUM OF ALL RESPONSES TO PHQ QUESTIONS 1-9: 0
SUM OF ALL RESPONSES TO PHQ QUESTIONS 1-9: 0
SUM OF ALL RESPONSES TO PHQ9 QUESTIONS 1 & 2: 0

## 2024-09-04 NOTE — ASSESSMENT & PLAN NOTE
Chronic, not at goal (unstable), improving, reinforced diet changes and lifestyle modifications recommended

## 2024-09-04 NOTE — PROGRESS NOTES
Chief Complaint   Patient presents with    Hypertension     3 week f/u hypertension     \"Have you been to the ER, urgent care clinic since your last visit?  Hospitalized since your last visit?\"    NO    “Have you seen or consulted any other health care providers outside of [facility] since your last visit?”    NO      “Have you had a colorectal cancer screening such as a colonoscopy/FIT/Cologuard?    NO    No colonoscopy on file  No cologuard on file  No FIT/FOBT on file   No flexible sigmoidoscopy on file         Click Here for Release of Records Request

## 2024-09-04 NOTE — PATIENT INSTRUCTIONS
Keep up the great work!    Patient's blood pressures are well-controlled. Recommend continue with same medication(s). Reviewed low sodium and heart healthy diet recommendations.

## 2024-12-11 ENCOUNTER — OFFICE VISIT (OUTPATIENT)
Facility: CLINIC | Age: 44
End: 2024-12-11
Payer: COMMERCIAL

## 2024-12-11 VITALS
RESPIRATION RATE: 18 BRPM | SYSTOLIC BLOOD PRESSURE: 142 MMHG | TEMPERATURE: 98 F | HEIGHT: 69 IN | BODY MASS INDEX: 30.02 KG/M2 | WEIGHT: 202.7 LBS | DIASTOLIC BLOOD PRESSURE: 90 MMHG | HEART RATE: 83 BPM

## 2024-12-11 DIAGNOSIS — H81.11 BENIGN PAROXYSMAL POSITIONAL VERTIGO OF RIGHT EAR: Primary | ICD-10-CM

## 2024-12-11 PROCEDURE — 3080F DIAST BP >= 90 MM HG: CPT

## 2024-12-11 PROCEDURE — 99213 OFFICE O/P EST LOW 20 MIN: CPT

## 2024-12-11 PROCEDURE — 3077F SYST BP >= 140 MM HG: CPT

## 2024-12-11 RX ORDER — DIMENHYDRINATE 50 MG
50 TABLET ORAL NIGHTLY PRN
Qty: 30 TABLET | Refills: 0 | Status: SHIPPED | OUTPATIENT
Start: 2024-12-11 | End: 2025-01-10

## 2024-12-11 ASSESSMENT — PATIENT HEALTH QUESTIONNAIRE - PHQ9
SUM OF ALL RESPONSES TO PHQ9 QUESTIONS 1 & 2: 0
1. LITTLE INTEREST OR PLEASURE IN DOING THINGS: NOT AT ALL
SUM OF ALL RESPONSES TO PHQ QUESTIONS 1-9: 0
2. FEELING DOWN, DEPRESSED OR HOPELESS: NOT AT ALL
SUM OF ALL RESPONSES TO PHQ QUESTIONS 1-9: 0

## 2024-12-11 NOTE — PROGRESS NOTES
Chief Complaint   Patient presents with    Dizziness     X  2  days.    Nausea     Denies vomiting.     1. Have you been to the ER, urgent care clinic since your last visit?  Hospitalized since your last visit?No    2. Have you seen or consulted any other health care providers outside of the [facility] since your last visit?  Include any pap smears or colon screening. No

## 2024-12-11 NOTE — PROGRESS NOTES
Family Medicine Office Visit  Patient: Elie Pina  1980, 44 y.o., male  Encounter Date: 2024      CHIEF COMPLAINT  Chief Complaint   Patient presents with    Dizziness     X  2  days.    Nausea     Denies vomiting.       SUBJECTIVE  Elie Pina is a 44 y.o. male with presenting today for dizziness. 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 Onset 2 days ago where he had a headache and made his way upstairs and developed dizziness that lasted a few minutes. Feels like everything around him in moving. Does not feel like his migraines where he gets an aura and then headache. Then yesterday, he has had ongoing episodes that last the rest of the night. Movements makes the dizziness and lightheadedness worse. Admits to nausea but no vomiting. Denies weakness or LOC. Took nurtec sample yesterday and did not help. Took otc bonine and did not help. On Saturday, he was at a party and drank 8-10 drinks/beers, but not have had any alcohol since then.        Review of Systems   All other systems reviewed and are negative.       Social History     Tobacco Use   Smoking Status Former    Current packs/day: 0.00    Types: Cigarettes    Quit date: 2015    Years since quittin.3   Smokeless Tobacco Former     Social History     Substance and Sexual Activity   Alcohol Use Yes    Alcohol/week: 10.0 standard drinks of alcohol    Types: 10 Cans of beer per week     Social History     Substance and Sexual Activity   Drug Use Never     Social History     Substance and Sexual Activity   Sexual Activity Yes    Partners: Female       Social Determinants of Health with Concerns     Tobacco Use: Medium Risk (2024)    Patient History     Smoking Tobacco Use: Former     Smokeless Tobacco Use: Former     Passive Exposure: Not on file   Alcohol Use: Not on file   Transportation Needs: Unknown (2024)    PRAPARE - Transportation     Lack of Transportation (Medical): Not on file     Lack of

## 2024-12-12 DIAGNOSIS — H81.11 BENIGN PAROXYSMAL POSITIONAL VERTIGO OF RIGHT EAR: ICD-10-CM

## 2024-12-12 LAB
ANION GAP SERPL CALC-SCNC: 7 MMOL/L (ref 2–12)
BASOPHILS # BLD: 0 K/UL (ref 0–0.1)
BASOPHILS NFR BLD: 1 % (ref 0–1)
BUN SERPL-MCNC: 10 MG/DL (ref 6–20)
BUN/CREAT SERPL: 12 (ref 12–20)
CALCIUM SERPL-MCNC: 9.5 MG/DL (ref 8.5–10.1)
CHLORIDE SERPL-SCNC: 104 MMOL/L (ref 97–108)
CO2 SERPL-SCNC: 28 MMOL/L (ref 21–32)
CREAT SERPL-MCNC: 0.82 MG/DL (ref 0.7–1.3)
DIFFERENTIAL METHOD BLD: ABNORMAL
EOSINOPHIL # BLD: 0.1 K/UL (ref 0–0.4)
EOSINOPHIL NFR BLD: 1 % (ref 0–7)
ERYTHROCYTE [DISTWIDTH] IN BLOOD BY AUTOMATED COUNT: 12.2 % (ref 11.5–14.5)
GLUCOSE SERPL-MCNC: 96 MG/DL (ref 65–100)
HCT VFR BLD AUTO: 44.3 % (ref 36.6–50.3)
HGB BLD-MCNC: 14.9 G/DL (ref 12.1–17)
IMM GRANULOCYTES # BLD AUTO: 0 K/UL (ref 0–0.04)
IMM GRANULOCYTES NFR BLD AUTO: 1 % (ref 0–0.5)
LYMPHOCYTES # BLD: 2.3 K/UL (ref 0.8–3.5)
LYMPHOCYTES NFR BLD: 40 % (ref 12–49)
MCH RBC QN AUTO: 31.9 PG (ref 26–34)
MCHC RBC AUTO-ENTMCNC: 33.6 G/DL (ref 30–36.5)
MCV RBC AUTO: 94.9 FL (ref 80–99)
MONOCYTES # BLD: 0.6 K/UL (ref 0–1)
MONOCYTES NFR BLD: 11 % (ref 5–13)
NEUTS SEG # BLD: 2.7 K/UL (ref 1.8–8)
NEUTS SEG NFR BLD: 47 % (ref 32–75)
NRBC # BLD: 0 K/UL (ref 0–0.01)
NRBC BLD-RTO: 0 PER 100 WBC
PLATELET # BLD AUTO: 246 K/UL (ref 150–400)
PMV BLD AUTO: 9.7 FL (ref 8.9–12.9)
POTASSIUM SERPL-SCNC: 4.2 MMOL/L (ref 3.5–5.1)
RBC # BLD AUTO: 4.67 M/UL (ref 4.1–5.7)
SODIUM SERPL-SCNC: 139 MMOL/L (ref 136–145)
WBC # BLD AUTO: 5.8 K/UL (ref 4.1–11.1)

## 2025-01-08 DIAGNOSIS — I10 ESSENTIAL (PRIMARY) HYPERTENSION: ICD-10-CM

## 2025-01-08 RX ORDER — METOPROLOL SUCCINATE 50 MG/1
50 TABLET, EXTENDED RELEASE ORAL DAILY
Qty: 90 TABLET | Refills: 1 | Status: SHIPPED | OUTPATIENT
Start: 2025-01-08

## 2025-01-08 NOTE — TELEPHONE ENCOUNTER
Kami faxed request  [facility] [address]    METOPROLOL SUCC ER 50 MG TAB    Take 1 tablet by mouth daily    Qty 90

## 2025-02-13 DIAGNOSIS — I10 ESSENTIAL (PRIMARY) HYPERTENSION: ICD-10-CM

## 2025-02-13 RX ORDER — LISINOPRIL 30 MG/1
30 TABLET ORAL DAILY
Qty: 90 TABLET | Refills: 1 | Status: SHIPPED | OUTPATIENT
Start: 2025-02-13

## 2025-02-13 NOTE — TELEPHONE ENCOUNTER
Kami faxed request  [facility]    LISINOPRIL 30 MG  Take 1 tablet by mouth every day    QTY 90

## 2025-03-10 NOTE — PROGRESS NOTES
Family Medicine Follow-Up Progress Note   [facility]    Patient Elie Pina  1980, 44 y.o., male  Encounter Date 03/11/25    ASSESSMENT AND PLAN:     Assessment & Plan      Assessment & Plan  Essential (primary) hypertension  Blood pressure remains elevated at 138/90.  - Monitor blood pressure at home and maintain a log for review in a few weeks.  - Refill prescriptions for lisinopril and metoprolol.  - Consider increasing lisinopril dosage if blood pressure remains high.    Orders:    lisinopril (PRINIVIL;ZESTRIL) 30 MG tablet; Take 1 tablet by mouth daily    metoprolol succinate (TOPROL XL) 50 MG extended release tablet; Take 1 tablet by mouth daily    Benign paroxysmal positional vertigo of right ear   Reports ongoing dizziness and imbalance.  - Did PT for vertigo - has video of him displaying nystagmus on his phone  - Continue allergy medications (Flonase and Claritin).  - Stay well-hydrated.  - Consider low-sugar electrolyte drinks periodically.  - Prescribe meclizine to be taken at bedtime to manage symptoms.    Orders:    meclizine (ANTIVERT) 25 MG tablet; Take 1 tablet by mouth 3 times daily as needed for Dizziness    Impaired fasting blood sugar  Will recheck this summer; reviewed diet recs         Allergic rhinitis, unspecified seasonality, unspecified trigger  - Continue taking Flonase and Claritin daily.    Orders:    loratadine (CLARITIN) 10 MG tablet; Take 1 tablet by mouth daily      Orders Placed This Encounter    lisinopril (PRINIVIL;ZESTRIL) 30 MG tablet     Sig: Take 1 tablet by mouth daily     Dispense:  90 tablet     Refill:  1    metoprolol succinate (TOPROL XL) 50 MG extended release tablet     Sig: Take 1 tablet by mouth daily     Dispense:  90 tablet     Refill:  1    loratadine (CLARITIN) 10 MG tablet     Sig: Take 1 tablet by mouth daily     Dispense:  90 tablet     Refill:  1    meclizine (ANTIVERT) 25 MG tablet     Sig: Take 1 tablet by mouth 3 times

## 2025-03-10 NOTE — ASSESSMENT & PLAN NOTE
Blood pressure remains elevated at 138/90.  - Monitor blood pressure at home and maintain a log for review in a few weeks.  - Refill prescriptions for lisinopril and metoprolol.  - Consider increasing lisinopril dosage if blood pressure remains high.    Orders:    lisinopril (PRINIVIL;ZESTRIL) 30 MG tablet; Take 1 tablet by mouth daily    metoprolol succinate (TOPROL XL) 50 MG extended release tablet; Take 1 tablet by mouth daily

## 2025-03-11 ENCOUNTER — OFFICE VISIT (OUTPATIENT)
Facility: CLINIC | Age: 45
End: 2025-03-11
Payer: COMMERCIAL

## 2025-03-11 ENCOUNTER — COMMUNITY OUTREACH (OUTPATIENT)
Facility: CLINIC | Age: 45
End: 2025-03-11

## 2025-03-11 VITALS
WEIGHT: 208 LBS | HEIGHT: 69 IN | DIASTOLIC BLOOD PRESSURE: 90 MMHG | SYSTOLIC BLOOD PRESSURE: 130 MMHG | TEMPERATURE: 98 F | RESPIRATION RATE: 20 BRPM | HEART RATE: 70 BPM | OXYGEN SATURATION: 98 % | BODY MASS INDEX: 30.81 KG/M2

## 2025-03-11 DIAGNOSIS — H81.11 BENIGN PAROXYSMAL POSITIONAL VERTIGO OF RIGHT EAR: ICD-10-CM

## 2025-03-11 DIAGNOSIS — I10 ESSENTIAL (PRIMARY) HYPERTENSION: Primary | ICD-10-CM

## 2025-03-11 DIAGNOSIS — J30.9 ALLERGIC RHINITIS, UNSPECIFIED SEASONALITY, UNSPECIFIED TRIGGER: ICD-10-CM

## 2025-03-11 DIAGNOSIS — R73.01 IMPAIRED FASTING BLOOD SUGAR: ICD-10-CM

## 2025-03-11 PROCEDURE — 3080F DIAST BP >= 90 MM HG: CPT | Performed by: FAMILY MEDICINE

## 2025-03-11 PROCEDURE — 3075F SYST BP GE 130 - 139MM HG: CPT | Performed by: FAMILY MEDICINE

## 2025-03-11 PROCEDURE — 99213 OFFICE O/P EST LOW 20 MIN: CPT | Performed by: FAMILY MEDICINE

## 2025-03-11 RX ORDER — METOPROLOL SUCCINATE 50 MG/1
50 TABLET, EXTENDED RELEASE ORAL DAILY
Qty: 90 TABLET | Refills: 1 | Status: SHIPPED | OUTPATIENT
Start: 2025-03-11

## 2025-03-11 RX ORDER — LORATADINE 10 MG/1
10 TABLET ORAL DAILY
Qty: 90 TABLET | Refills: 1 | Status: SHIPPED | OUTPATIENT
Start: 2025-03-11

## 2025-03-11 RX ORDER — LISINOPRIL 30 MG/1
30 TABLET ORAL DAILY
Qty: 90 TABLET | Refills: 1 | Status: SHIPPED | OUTPATIENT
Start: 2025-03-11

## 2025-03-11 RX ORDER — MECLIZINE HYDROCHLORIDE 25 MG/1
25 TABLET ORAL 3 TIMES DAILY PRN
Qty: 15 TABLET | Refills: 0 | Status: SHIPPED | OUTPATIENT
Start: 2025-03-11 | End: 2025-03-21

## 2025-03-11 SDOH — ECONOMIC STABILITY: FOOD INSECURITY: WITHIN THE PAST 12 MONTHS, THE FOOD YOU BOUGHT JUST DIDN'T LAST AND YOU DIDN'T HAVE MONEY TO GET MORE.: NEVER TRUE

## 2025-03-11 SDOH — ECONOMIC STABILITY: FOOD INSECURITY: WITHIN THE PAST 12 MONTHS, YOU WORRIED THAT YOUR FOOD WOULD RUN OUT BEFORE YOU GOT MONEY TO BUY MORE.: NEVER TRUE

## 2025-03-11 SDOH — ECONOMIC STABILITY: TRANSPORTATION INSECURITY
IN THE PAST 12 MONTHS, HAS THE LACK OF TRANSPORTATION KEPT YOU FROM MEDICAL APPOINTMENTS OR FROM GETTING MEDICATIONS?: NO

## 2025-03-11 SDOH — ECONOMIC STABILITY: INCOME INSECURITY: IN THE LAST 12 MONTHS, WAS THERE A TIME WHEN YOU WERE NOT ABLE TO PAY THE MORTGAGE OR RENT ON TIME?: NO

## 2025-03-11 SDOH — ECONOMIC STABILITY: TRANSPORTATION INSECURITY
IN THE PAST 12 MONTHS, HAS LACK OF TRANSPORTATION KEPT YOU FROM MEETINGS, WORK, OR FROM GETTING THINGS NEEDED FOR DAILY LIVING?: NO

## 2025-03-11 ASSESSMENT — PATIENT HEALTH QUESTIONNAIRE - PHQ9
SUM OF ALL RESPONSES TO PHQ QUESTIONS 1-9: 0
1. LITTLE INTEREST OR PLEASURE IN DOING THINGS: NOT AT ALL
2. FEELING DOWN, DEPRESSED OR HOPELESS: NOT AT ALL
SUM OF ALL RESPONSES TO PHQ QUESTIONS 1-9: 0

## 2025-03-11 NOTE — PROGRESS NOTES
Chief Complaint   Patient presents with    Hypertension     Follow up      \"Have you been to the ER, urgent care clinic since your last visit?  Hospitalized since your last visit?\"    NO    “Have you seen or consulted any other health care providers outside of [facility] since your last visit?”    NO      “Have you had a colorectal cancer screening such as a colonoscopy/FIT/Cologuard?    NO    No colonoscopy on file  No cologuard on file  No FIT/FOBT on file   No flexible sigmoidoscopy on file         Click Here for Release of Records Request

## 2025-03-11 NOTE — ASSESSMENT & PLAN NOTE
- Continue taking Flonase and Claritin daily.    Orders:    loratadine (CLARITIN) 10 MG tablet; Take 1 tablet by mouth daily